# Patient Record
Sex: FEMALE | Race: WHITE | URBAN - METROPOLITAN AREA
[De-identification: names, ages, dates, MRNs, and addresses within clinical notes are randomized per-mention and may not be internally consistent; named-entity substitution may affect disease eponyms.]

---

## 2017-01-12 ENCOUNTER — OFFICE VISIT - RIVER FALLS (OUTPATIENT)
Dept: FAMILY MEDICINE | Facility: CLINIC | Age: 72
End: 2017-01-12
Payer: COMMERCIAL

## 2017-01-12 ASSESSMENT — MIFFLIN-ST. JEOR: SCORE: 1381.26

## 2017-02-28 ENCOUNTER — OFFICE VISIT - RIVER FALLS (OUTPATIENT)
Dept: FAMILY MEDICINE | Facility: CLINIC | Age: 72
End: 2017-02-28
Payer: COMMERCIAL

## 2017-02-28 ASSESSMENT — MIFFLIN-ST. JEOR: SCORE: 1386.7

## 2017-03-17 ENCOUNTER — OFFICE VISIT - RIVER FALLS (OUTPATIENT)
Dept: FAMILY MEDICINE | Facility: CLINIC | Age: 72
End: 2017-03-17
Payer: COMMERCIAL

## 2017-03-17 ASSESSMENT — MIFFLIN-ST. JEOR: SCORE: 1395.78

## 2017-06-01 ENCOUNTER — TRANSFERRED RECORDS (OUTPATIENT)
Dept: MULTI SPECIALTY CLINIC | Facility: CLINIC | Age: 72
End: 2017-06-01
Payer: COMMERCIAL

## 2017-06-01 ENCOUNTER — OFFICE VISIT - RIVER FALLS (OUTPATIENT)
Dept: FAMILY MEDICINE | Facility: CLINIC | Age: 72
End: 2017-06-01
Payer: COMMERCIAL

## 2017-06-01 ASSESSMENT — MIFFLIN-ST. JEOR: SCORE: 1383.98

## 2017-09-05 ENCOUNTER — OFFICE VISIT - RIVER FALLS (OUTPATIENT)
Dept: FAMILY MEDICINE | Facility: CLINIC | Age: 72
End: 2017-09-05
Payer: COMMERCIAL

## 2017-09-05 ASSESSMENT — MIFFLIN-ST. JEOR: SCORE: 1383.08

## 2018-01-02 ENCOUNTER — OFFICE VISIT - RIVER FALLS (OUTPATIENT)
Dept: FAMILY MEDICINE | Facility: CLINIC | Age: 73
End: 2018-01-02
Payer: COMMERCIAL

## 2018-01-02 ASSESSMENT — MIFFLIN-ST. JEOR: SCORE: 1432.06

## 2018-04-05 ENCOUNTER — AMBULATORY - RIVER FALLS (OUTPATIENT)
Dept: FAMILY MEDICINE | Facility: CLINIC | Age: 73
End: 2018-04-05
Payer: COMMERCIAL

## 2018-04-12 ENCOUNTER — OFFICE VISIT - RIVER FALLS (OUTPATIENT)
Dept: FAMILY MEDICINE | Facility: CLINIC | Age: 73
End: 2018-04-12
Payer: COMMERCIAL

## 2018-04-12 ASSESSMENT — MIFFLIN-ST. JEOR: SCORE: 1399.4

## 2021-01-19 ENCOUNTER — HOME INFUSION (PRE-WILLOW HOME INFUSION) (OUTPATIENT)
Dept: PHARMACY | Facility: CLINIC | Age: 76
End: 2021-01-19

## 2021-01-20 ENCOUNTER — HOME INFUSION (PRE-WILLOW HOME INFUSION) (OUTPATIENT)
Dept: PHARMACY | Facility: CLINIC | Age: 76
End: 2021-01-20

## 2021-01-20 NOTE — PROGRESS NOTES
This is a recent snapshot of the patient's [facility] medical record.  For current drug dose and complete information and questions, call [phone] or In Basket pool, fv home infusion ([id number])  CSN Number:  [id number]

## 2021-01-21 ENCOUNTER — HOME INFUSION (PRE-WILLOW HOME INFUSION) (OUTPATIENT)
Dept: PHARMACY | Facility: CLINIC | Age: 76
End: 2021-01-21

## 2021-01-21 NOTE — PROGRESS NOTES
This is a recent snapshot of the patient's [facility] medical record.  For current drug dose and complete information and questions, call [phone] or In Basket pool, fv home infusion ([id number])  CSN Number:  [id number]

## 2021-01-22 NOTE — PROGRESS NOTES
This is a recent snapshot of the patient's [facility] medical record.  For current drug dose and complete information and questions, call [phone] or In Basket pool, fv home infusion ([id number])  CSN Number:  [id number]

## 2021-01-28 ENCOUNTER — HOME INFUSION (PRE-WILLOW HOME INFUSION) (OUTPATIENT)
Dept: PHARMACY | Facility: CLINIC | Age: 76
End: 2021-01-28

## 2021-01-29 NOTE — PROGRESS NOTES
This is a recent snapshot of the patient's [facility] medical record.  For current drug dose and complete information and questions, call [phone] or In Basket pool, fv home infusion ([id number])  CSN Number:  [id number]

## 2021-08-15 ENCOUNTER — LAB REQUISITION (OUTPATIENT)
Dept: LAB | Facility: CLINIC | Age: 76
End: 2021-08-15
Payer: MEDICARE

## 2021-08-15 DIAGNOSIS — F02.818 DEMENTIA IN OTHER DISEASES CLASSIFIED ELSEWHERE WITH BEHAVIORAL DISTURBANCE: ICD-10-CM

## 2021-08-15 DIAGNOSIS — R63.5 ABNORMAL WEIGHT GAIN: ICD-10-CM

## 2021-08-15 DIAGNOSIS — E55.9 VITAMIN D DEFICIENCY, UNSPECIFIED: ICD-10-CM

## 2021-08-15 DIAGNOSIS — R60.0 LOCALIZED EDEMA: ICD-10-CM

## 2021-08-15 DIAGNOSIS — I10 ESSENTIAL (PRIMARY) HYPERTENSION: ICD-10-CM

## 2021-11-15 ENCOUNTER — LAB REQUISITION (OUTPATIENT)
Dept: LAB | Facility: CLINIC | Age: 76
End: 2021-11-15
Payer: COMMERCIAL

## 2021-11-15 DIAGNOSIS — N39.0 URINARY TRACT INFECTION, SITE NOT SPECIFIED: ICD-10-CM

## 2021-11-16 ENCOUNTER — LAB REQUISITION (OUTPATIENT)
Dept: LAB | Facility: CLINIC | Age: 76
End: 2021-11-16
Payer: COMMERCIAL

## 2021-11-16 LAB
ALBUMIN UR-MCNC: NEGATIVE MG/DL
APPEARANCE UR: CLEAR
BILIRUB UR QL STRIP: NEGATIVE
COLOR UR AUTO: ABNORMAL
GLUCOSE UR STRIP-MCNC: NEGATIVE MG/DL
HGB UR QL STRIP: NEGATIVE
KETONES UR STRIP-MCNC: NEGATIVE MG/DL
LEUKOCYTE ESTERASE UR QL STRIP: ABNORMAL
MUCOUS THREADS #/AREA URNS LPF: PRESENT /LPF
NITRATE UR QL: NEGATIVE
PH UR STRIP: 5 [PH] (ref 5–7)
RBC URINE: 1 /HPF
SP GR UR STRIP: 1.01 (ref 1–1.03)
SQUAMOUS EPITHELIAL: 1 /HPF
UROBILINOGEN UR STRIP-MCNC: <2 MG/DL
WBC URINE: 2 /HPF

## 2021-11-16 PROCEDURE — 81001 URINALYSIS AUTO W/SCOPE: CPT | Mod: ORL | Performed by: FAMILY MEDICINE

## 2021-11-16 PROCEDURE — 87086 URINE CULTURE/COLONY COUNT: CPT | Mod: ORL | Performed by: FAMILY MEDICINE

## 2021-11-17 LAB — BACTERIA UR CULT: NORMAL

## 2021-11-22 ENCOUNTER — LAB REQUISITION (OUTPATIENT)
Dept: LAB | Facility: CLINIC | Age: 76
End: 2021-11-22
Payer: COMMERCIAL

## 2021-11-22 DIAGNOSIS — I10 ESSENTIAL (PRIMARY) HYPERTENSION: ICD-10-CM

## 2021-11-23 LAB
ANION GAP SERPL CALCULATED.3IONS-SCNC: 10 MMOL/L (ref 5–18)
BUN SERPL-MCNC: 23 MG/DL (ref 8–28)
CALCIUM SERPL-MCNC: 9.6 MG/DL (ref 8.5–10.5)
CHLORIDE BLD-SCNC: 106 MMOL/L (ref 98–107)
CO2 SERPL-SCNC: 28 MMOL/L (ref 22–31)
CREAT SERPL-MCNC: 1.19 MG/DL (ref 0.6–1.1)
ERYTHROCYTE [DISTWIDTH] IN BLOOD BY AUTOMATED COUNT: 13.9 % (ref 10–15)
GFR SERPL CREATININE-BSD FRML MDRD: 44 ML/MIN/1.73M2
GLUCOSE BLD-MCNC: 85 MG/DL (ref 70–125)
HCT VFR BLD AUTO: 46.7 % (ref 35–47)
HGB BLD-MCNC: 14.9 G/DL (ref 11.7–15.7)
MCH RBC QN AUTO: 30.5 PG (ref 26.5–33)
MCHC RBC AUTO-ENTMCNC: 31.9 G/DL (ref 31.5–36.5)
MCV RBC AUTO: 96 FL (ref 78–100)
PLATELET # BLD AUTO: 291 10E3/UL (ref 150–450)
POTASSIUM BLD-SCNC: 4.5 MMOL/L (ref 3.5–5)
RBC # BLD AUTO: 4.88 10E6/UL (ref 3.8–5.2)
SODIUM SERPL-SCNC: 144 MMOL/L (ref 136–145)
TSH SERPL DL<=0.005 MIU/L-ACNC: 1.93 UIU/ML (ref 0.3–5)
WBC # BLD AUTO: 6.1 10E3/UL (ref 4–11)

## 2021-11-23 PROCEDURE — 85027 COMPLETE CBC AUTOMATED: CPT | Mod: ORL | Performed by: FAMILY MEDICINE

## 2021-11-23 PROCEDURE — 84443 ASSAY THYROID STIM HORMONE: CPT | Mod: ORL | Performed by: FAMILY MEDICINE

## 2021-11-23 PROCEDURE — 36415 COLL VENOUS BLD VENIPUNCTURE: CPT | Mod: ORL | Performed by: FAMILY MEDICINE

## 2021-11-23 PROCEDURE — P9603 ONE-WAY ALLOW PRORATED MILES: HCPCS | Mod: ORL | Performed by: FAMILY MEDICINE

## 2021-11-23 PROCEDURE — 80048 BASIC METABOLIC PNL TOTAL CA: CPT | Mod: ORL | Performed by: FAMILY MEDICINE

## 2022-02-06 ENCOUNTER — LAB REQUISITION (OUTPATIENT)
Dept: LAB | Facility: CLINIC | Age: 77
End: 2022-02-06
Payer: COMMERCIAL

## 2022-02-06 DIAGNOSIS — D72.829 ELEVATED WHITE BLOOD CELL COUNT, UNSPECIFIED: ICD-10-CM

## 2022-02-08 LAB
BASOPHILS # BLD AUTO: 0 10E3/UL (ref 0–0.2)
BASOPHILS NFR BLD AUTO: 0 %
EOSINOPHIL # BLD AUTO: 0 10E3/UL (ref 0–0.7)
EOSINOPHIL NFR BLD AUTO: 0 %
ERYTHROCYTE [DISTWIDTH] IN BLOOD BY AUTOMATED COUNT: 14.2 % (ref 10–15)
HCT VFR BLD AUTO: 44.9 % (ref 35–47)
HGB BLD-MCNC: 14.3 G/DL (ref 11.7–15.7)
IMM GRANULOCYTES # BLD: 0 10E3/UL
IMM GRANULOCYTES NFR BLD: 0 %
LYMPHOCYTES # BLD AUTO: 0.2 10E3/UL (ref 0.8–5.3)
LYMPHOCYTES NFR BLD AUTO: 2 %
MCH RBC QN AUTO: 31.1 PG (ref 26.5–33)
MCHC RBC AUTO-ENTMCNC: 31.8 G/DL (ref 31.5–36.5)
MCV RBC AUTO: 98 FL (ref 78–100)
MONOCYTES # BLD AUTO: 0.5 10E3/UL (ref 0–1.3)
MONOCYTES NFR BLD AUTO: 5 %
NEUTROPHILS # BLD AUTO: 9.3 10E3/UL (ref 1.6–8.3)
NEUTROPHILS NFR BLD AUTO: 93 %
NRBC # BLD AUTO: 0 10E3/UL
NRBC BLD AUTO-RTO: 0 /100
PLATELET # BLD AUTO: 294 10E3/UL (ref 150–450)
RBC # BLD AUTO: 4.6 10E6/UL (ref 3.8–5.2)
WBC # BLD AUTO: 10 10E3/UL (ref 4–11)

## 2022-02-08 PROCEDURE — 36415 COLL VENOUS BLD VENIPUNCTURE: CPT | Mod: ORL | Performed by: PHYSICIAN ASSISTANT

## 2022-02-08 PROCEDURE — 85025 COMPLETE CBC W/AUTO DIFF WBC: CPT | Mod: ORL | Performed by: PHYSICIAN ASSISTANT

## 2022-02-08 PROCEDURE — P9603 ONE-WAY ALLOW PRORATED MILES: HCPCS | Mod: ORL | Performed by: PHYSICIAN ASSISTANT

## 2022-02-11 VITALS
DIASTOLIC BLOOD PRESSURE: 72 MMHG | WEIGHT: 181.6 LBS | BODY MASS INDEX: 26.37 KG/M2 | WEIGHT: 184.2 LBS | SYSTOLIC BLOOD PRESSURE: 126 MMHG | HEIGHT: 70 IN | DIASTOLIC BLOOD PRESSURE: 84 MMHG | OXYGEN SATURATION: 98 % | SYSTOLIC BLOOD PRESSURE: 130 MMHG | HEART RATE: 82 BPM | HEART RATE: 62 BPM | BODY MASS INDEX: 26 KG/M2 | HEIGHT: 70 IN

## 2022-02-11 VITALS
DIASTOLIC BLOOD PRESSURE: 84 MMHG | HEART RATE: 86 BPM | SYSTOLIC BLOOD PRESSURE: 134 MMHG | OXYGEN SATURATION: 94 % | WEIGHT: 192.2 LBS | BODY MASS INDEX: 27.52 KG/M2 | HEIGHT: 70 IN

## 2022-02-11 VITALS
DIASTOLIC BLOOD PRESSURE: 74 MMHG | DIASTOLIC BLOOD PRESSURE: 70 MMHG | HEART RATE: 78 BPM | HEIGHT: 70 IN | WEIGHT: 181 LBS | BODY MASS INDEX: 26.08 KG/M2 | BODY MASS INDEX: 25.91 KG/M2 | WEIGHT: 182.2 LBS | HEART RATE: 78 BPM | OXYGEN SATURATION: 97 % | SYSTOLIC BLOOD PRESSURE: 122 MMHG | HEIGHT: 70 IN | SYSTOLIC BLOOD PRESSURE: 122 MMHG | TEMPERATURE: 98.6 F

## 2022-02-11 VITALS
DIASTOLIC BLOOD PRESSURE: 78 MMHG | OXYGEN SATURATION: 95 % | SYSTOLIC BLOOD PRESSURE: 130 MMHG | HEART RATE: 91 BPM | BODY MASS INDEX: 26.48 KG/M2 | WEIGHT: 185 LBS | DIASTOLIC BLOOD PRESSURE: 78 MMHG | SYSTOLIC BLOOD PRESSURE: 128 MMHG | HEIGHT: 70 IN

## 2022-02-11 VITALS
WEIGHT: 181.4 LBS | OXYGEN SATURATION: 98 % | BODY MASS INDEX: 25.97 KG/M2 | HEIGHT: 70 IN | DIASTOLIC BLOOD PRESSURE: 86 MMHG | HEART RATE: 78 BPM | SYSTOLIC BLOOD PRESSURE: 132 MMHG

## 2022-02-16 NOTE — PROGRESS NOTES
Patient:   MARY ALICE SMITH            MRN: [id number]            FIN: [id number]               Age:   71 years     Sex:  Female     :  1945   Associated Diagnoses:   Medicare annual wellness visit, initial; Hypercholesteremia; Hypertension; Memory Loss or Impairment; Mild obstructive sleep apnea; Depression; ADHD (attention deficit hyperactivity disorder)   Author:   Cale Horta MD      Visit Information      Care Providers  Not recorded for selected visit.  Ancillary Services  Not recorded for selected visit.          Current Visit Date:  2017  Last AWV Date:  2016  Initial AWV Date:  2015          Chief Complaint   2017 10:03 AM CDT    AWV      History of Present Illness             The patient presents for Medicare annual wellness exam.  The patient's general health status is described as unchanged and stable.  The patient's diet is described as balanced.  Exercise occasional.  Associated symptoms consist of denies shortness of breath and denies weight loss.     She has not been taking aricept due to running out of medication. She does take namenda. She understands her memory is not as good as it use to be and that does make her feel depressed. She does start crying easier. She still does the cooking and cleaning. She has noticed that she has a tremor when she is sewing or baking. She has not been using her cpap machine. She doesn't have the nightmares she use to have.    testing/labs: colonoscopy: 09, Mammogram : 16. Dexa: 13. Chem 8 and Lipid panel: 17  immunization: tetnus: 2002. Prevnar: 2015. Pneumovax: . Shingles: 2009      Review of Systems   Constitutional:  No fever.    Ear/Nose/Mouth/Throat:  Hearing is evaluated.    See completed Health History for Review of Systems   Respiratory:  No shortness of breath.    Cardiovascular:  No chest pain.    Gastrointestinal:  No nausea, No vomiting.    Musculoskeletal:       Back pain: Bilaterally.     Integumentary:  No rash.    Neurologic:  Memory loss, Tremor.       Health Status   Allergies:    Allergic Reactions (Selected)  Severity Not Documented  Sulfa drugs (No reactions were documented)   Medications:  (Selected)   Prescriptions  Prescribed  Adderall XR 10 mg oral capsule, extended release: 1 cap(s) ( 10 mg ), po, qam, # 30 cap(s), 0 Refill(s), Type: Maintenance, Pharmacy: [facility] [address], 1 cap(s) po qam  Detrol LA 4 mg oral capsule, extended release: 1 cap(s) ( 4 mg ), po, daily, # 90 cap(s), 3 Refill(s), Type: Maintenance, Pharmacy: [facility] [address], 1 cap(s) po daily  Imitrex 50 mg oral tablet: 1 tab(s) ( 50 mg ), PO, Once, Instructions: may repeat dose once in 2 hours, PRN: for migraine headache, # 18 tab(s), 1 Refill(s), Type: Soft Stop, Pharmacy: [facility] [address], 1 tab(s) po once,PRN:for migraine headache,Instr:may repeat dose...  Namenda 5 mg oral tablet: See Instructions, Instructions: 1 tab(s) po daily for 1 week than increase to 1 tablet po pid, # 180 tab(s), 1 Refill(s), Type: Maintenance, Pharmacy: [facility] [address], 1 tab(s) po daily for 1 week than increase to 1 tablet po pid  buPROPion 150 mg/12 hours (SR) oral tablet, extended release: See Instructions, Instructions: TAKE 1 TABLET BY MOUTH TWICE DAILY, # 180 tab(s), Type: Soft Stop, Pharmacy: [facility] [address]  donepezil 10 mg oral tablet: 1 tab(s) ( 10 mg ), po, hs, # 30 tab(s), 0 Refill(s), Type: Maintenance, Pharmacy: [facility] [address], pt is due for annual exam  hydroCHLOROthiazide-lisinopril 12.5 mg-20 mg oral tablet: 1 tab(s), po, daily, # 90 tab(s), 0 Refill(s), Type: Maintenance, Pharmacy: [facility] [address]  simvastatin 20 mg oral tablet: 1 tab(s) ( 20 mg ), po, hs, # 90 tab(s), 2 Refill(s), Type: Maintenance, Pharmacy: [facility] [address]  triamcinolone 0.1% topical cream: 1 sami, TOP, BID, # 15 g, 1 Refill(s), Type: Maintenance, Pharmacy: [facility] [address], 1 sami top bid  Documented Medications  Documented  Adderall XR 10 mg oral capsule, extended release: 1 cap(s) ( 10 mg ), po, qam, 0 Refill(s), Type: Maintenance  Aleve: ( 220 mg ), po, 0 Refill(s), Type: Maintenance  Calcium 600+D: ( 1,200 mg ), po, daily, 0 Refill(s), Type: Maintenance  Lucy-C 500 mg oral tablet: ( 500 mg ), po, daily, 0 Refill(s), Type: Maintenance  Ginko: Ginko, Supply, 0 Refill(s), Type: Maintenance  Vitamin D with Minerals oral tablet: 1 tab(s), po, daily, 0 Refill(s), Type: Maintenance  biotin: PO, daily, 0 Refill(s), Type: Soft Stop  escitalopram 5 mg oral tablet: 1 tab(s) ( 5 mg ), PO, Daily, # 30 tab(s), 0 Refill(s), Type: Maintenance  magnesium oxide: ( 400 mg ), po, 0 Refill(s), Type: Maintenance   Problem list:    All Problems  Depression / SNOMED CT 830021523 / Confirmed  Mild obstructive sleep apnea / SNOMED CT 762225170 / Confirmed  Mitral valve prolapse / ICD-9-.0 / Confirmed  Memory Loss or Impairment / ICD-9-.93 / Confirmed  Hypertension / ICD-9-.9 / Confirmed  Hypercholesteremia / SNOMED CT 80858094 / Confirmed  ADHD (attention deficit hyperactivity disorder) / SNOMED CT 24564631 / Confirmed   Medicare Assessments      Fall Risk Screen  Not recorded for selected visit.     Functional Assessment  Not recorded for selected visit.       Geriatric Depression Screen  Not recorded for selected visit.     Hearing Screen  Not recorded for selected visit.     Home Safety Screen  Not recorded for selected visit.     Vision Screen  Not recorded for selected visit.     ADL's and Safety reviewed with patient.      Histories   Past Medical History:    Active  Mitral valve prolapse (424.0)  Comments:  7/21/2010 CDT 11:53 AM CDT - Emma Payne  prophylaxis  Depression (979878360)  Resolved  Pregnancy (989264769):  Resolved in 1964 at 18 years.  Pregnancy (066387304):  Resolved in 1969 at 23 years.   Family History:    Lymphoma  Father  Dementia  Mother  ()  Hypertension  Father  Kidney disease  Father  CVA - Cerebrovascular accident  Grandmother (P)  Grandfather (P)  Aphasia  Mother ()     Procedure history:    CPAP  Titration Study on 10/26/2010 at 64 Years.  Comments:  2011 8:16 AM - Marta Acevedo CMA  Mild MAY.  +7cm H2o  Excision of exposed vaginal mesh on 10/15/2010 at 64 Years.  Comments:  2010 10:59 AM - Marilyn Oleary  Exposed vaginal mesh.  Polysomnogram (862434725) on 10/6/2010 at 64 Years.  Comments:  2011 8:16 AM - Marta Acevedo CMA  Mild MAY.  Colonoscopy (076484646) on 2009 at 63 Years.  Comments:  2016 3:21 PM - Emma Terrell MA  Sedation:   IV.    Findings:   no polyps/abnormalities seen  LAVH - Laparoscopy assisted vaginal hysterectomy (7631737463) on 5/3/2006 at 60 Years.  BSO - Bilateral salpingo-oophorectomy (7010979553) on 5/3/2006 at 60 Years.  Introduction of tension free vaginal tape (268866541) on 5/3/2006 at 60 Years.  Comments:  10/24/2013 10:59 AM - Emma Payne  cystoscopy  Repair of cystocele and rectocele (23703060) on 5/3/2006 at 60 Years.  Sacrospinous fixation of vaginal vault (354876230) on 5/3/2006 at 60 Years.  Urodynamics (612453165) on 2006 at 60 Years.  Flexible sigmoidoscopy (57436801) on 2004 at 58 Years.  Colposcopy (2717543939) on 2002 at 56 Years.  LEEP (75292807) on 2000 at 54 Years.  Colposcopy (5282843055) on 2000 at 54 Years.  Colposcopy (8658644499) on 1997 at 51 Years.  Colposcopy (8850641928) on 3/9/1994 at 48 Years.  Procedure on ankle (093697188) in 1958 at 13 Years.  Tonsillectomy (195132338) in 1947 at 2 Years.  Childbirth (7753124141).  Comments:  10/24/2013 11:23 AM - Emma Payne/no date  Childbirth (6159412364).  Comments:  10/24/2013 11:24 AM - Emma Payne/no date  Miscarriage (83501955).   Social History:        Alcohol Assessment            Never      Tobacco Assessment            Never      Substance Abuse  Assessment            Never      Employment and Education Assessment            Retired, Work/School description: .      Home and Environment Assessment            Marital status: .  Spouse/Partner name: Griffin.  Lives with Spouse.      Nutrition and Health Assessment            Type of diet: Regular.      Exercise and Physical Activity Assessment: Does not exercise            Exercise type: Walking.      Sexual Assessment            Sexually active: Yes.  Sexual orientation: Heterosexual.  Other contraceptive use: None.        Physical Examination   Vital Signs   6/1/2017 10:03 AM CDT Peripheral Pulse Rate 62 bpm    Systolic Blood Pressure 126 mmHg    Diastolic Blood Pressure 72 mmHg    Mean Arterial Pressure 90 mmHg      Measurements from flowsheet : Measurements   6/1/2017 10:03 AM CDT Height Measured - Standard 70 in    Weight Measured - Standard 181.6 lb    BSA 2.01 m2    Body Mass Index 26.05 kg/m2      General:  Alert and oriented, No acute distress.    Eye:  Pupils are equal, round and reactive to light, Normal conjunctiva.    HENT:  Tympanic membranes are clear, Oral mucosa is moist.    Neck:  Supple, Non-tender, No carotid bruit, No lymphadenopathy.    Respiratory:  Respirations are non-labored.    Cardiovascular:  Normal rate, Regular rhythm, No edema.    Gastrointestinal:  Soft, Non-tender, Non-distended.    Musculoskeletal:  Normal range of motion, Normal gait.    Integumentary:  Warm, No rash.    Neurologic:  Alert, Oriented, No focal deficits.    Cognition and Speech:  Oriented, Speech clear and coherent, Functional cognition intact.    Psychiatric:  Cooperative, Appropriate mood & affect, Normal judgment, Patient's GDS and CAGE questionnaire reviewed and discussed with patient. .       Impression and Plan   Course:  Progressing as expected.    Plan:  Discussed  preventative services.  Appropriate weight and diet discussed.  Discussed Advance Life Directives.    Preventative services  needed::  Preventative services checklist reviewed, updated and copy given to patient.  Please see scanned document.         HIV risk screening: No.    Patient Instructions:          Limitations: Limit caffeine intake, Limit alcohol consumption.         Counseled: Patient, Regarding treatment, Regarding medications, Diet, Activity, Verbalized understanding.    Diagnosis     Medicare annual wellness visit, initial (ARF90-JH Z00.00).     Hypercholesteremia (ACV68-XX E78.0).     Hypertension (FDS03-AJ I10).     Memory Loss or Impairment (VXO46-JC R41.3).     Mild obstructive sleep apnea (SWU00-RK G47.33).     Depression (ZSM11-CO F33.1).     ADHD (attention deficit hyperactivity disorder) (XIF15-JZ F90.0).     Plan:  Will continue current medicatons at current doses. Discussed side effects of medications. Will restart her on Aricept, discussed that she may have some nausea the first week after restarting. Reviewed exercise and diet;  Discussed weight and weight loss;  Reviewed health maintenance and encouraged completion;  Questions were answered regarding health, medicines, and future expectations.   Follow up in 1 year.

## 2022-02-16 NOTE — PROGRESS NOTES
Patient:   MARY ALICE SMITH            MRN: [id number]            FIN: [id number]               Age:   71 years     Sex:  Female     :  1945   Associated Diagnoses:   Hypercholesteremia; Hypertension; Memory Loss or Impairment; ADHD (attention deficit hyperactivity disorder)   Author:   Cale Horta MD      Chief Complaint   2017 1:19 PM CDT     Pt here for fu with meds, she says she is doing ok on meds, would like refills        Interval History   Follow up on attention deficit disorder.    Medication Dose:  Adderall 10 mg q am    Daily Functions (School/Grades/Work):  She is doing well on medication and notices benefit while on it.    Memory loss: She currently takes Aricept. is her memory and support.  no recent changes      Review of Systems   Constitutional:  Negative.    Eye:  Negative.    Ear/Nose/Mouth/Throat:  Negative.    Respiratory:  Negative.    Cardiovascular:  No palpitations, No tachycardia.    Gastrointestinal:  No nausea, No vomiting, No diarrhea.    Genitourinary:  Negative.    Musculoskeletal:  Negative.    Integumentary:  Negative.    Neurologic:  No headache.    Psychiatric:  No anxiety, No depression.       Health Status   Allergies:    Allergic Reactions (Selected)  Severity Not Documented  Sulfa drugs (No reactions were documented)   Problem list:    All Problems  Mitral valve prolapse / 424.0 / Confirmed  prophylaxis  Depression / 665612789 / Confirmed  Mild obstructive sleep apnea / 231284939 / Confirmed  AHI 7. Optimal CPAP titration to 7.  Hypertension / 401.9 / Confirmed  Memory loss or impairment / 8515997139 / Confirmed  ADHD (attention deficit hyperactivity disorder) / 92951151 / Confirmed  Hypercholesteremia / 77779271 / Confirmed  Resolved: Pregnancy / 910594810  Resolved: Pregnancy / 443969082  Canceled: Hypertension / 401.9  Canceled: Hypercholesterolemia / 21914181   Medications:  (Selected)   Prescriptions  Prescribed  Adderall XR 10 mg oral capsule,  extended release: 1 cap(s) ( 10 mg ), po, qam, Instructions: fill in 1 month, # 30 cap(s), 0 Refill(s), Type: Maintenance, Pharmacy: [facility] [address], 1 cap(s) po qam,Instr:fill in 1 month  Detrol LA 4 mg oral capsule, extended release: 1 cap(s) ( 4 mg ), po, daily, # 90 cap(s), 3 Refill(s), Type: Maintenance, Pharmacy: [facility] [address], 1 cap(s) po daily,x90 day(s)  Imitrex 50 mg oral tablet: 1 tab(s) ( 50 mg ), PO, Once, Instructions: may repeat dose once in 2 hours, PRN: for migraine headache, # 18 tab(s), 1 Refill(s), Type: Soft Stop, Pharmacy: [facility], 1 tab(s) po once,PRN:for migraine headache,Instr:may repeat dose...  Namenda 5 mg oral tablet: 1 tab(s) ( 5 mg ), po, bid, # 180 tab(s), 3 Refill(s), Type: Maintenance, Pharmacy: [facility] [address], 1 tab(s) po bid,x90 day(s)  buPROPion 150 mg/12 hours (SR) oral tablet, extended release: 1 tab(s) ( 150 mg ), po, bid, # 180 tab(s), 3 Refill(s), Type: Soft Stop, Pharmacy: [facility] [address], 1 tab(s) po bid,x90 day(s)  donepezil 10 mg oral tablet: 1 tab(s) ( 10 mg ), po, hs, # 90 tab(s), 3 Refill(s), Type: Maintenance, Pharmacy: [facility] [address], 1 tab(s) po hs,x90 day(s)  escitalopram 5 mg oral tablet: 1 tab(s) ( 5 mg ), po, daily, # 90 tab(s), 2 Refill(s), Type: Maintenance, Pharmacy: [facility]  hydrochlorothiazide-lisinopril 12.5 mg-20 mg oral tablet: 1 tab(s), po, daily, # 90 tab(s), 3 Refill(s), Type: Maintenance, Pharmacy: [facility] [address], 1 tab(s) po daily,x90 day(s)  simvastatin 20 mg oral tablet: 1 tab(s) ( 20 mg ), po, hs, # 90 tab(s), 3 Refill(s), Type: Maintenance, Pharmacy: [facility] [address], 1 tab(s) po hs,x90 day(s)  Documented Medications  Documented  Aleve: ( 220 mg ), po, 0 Refill(s), Type: Maintenance  Calcium 600+D: ( 1,200 mg ), po, daily, 0 Refill(s), Type: Maintenance  Lucy-C 500 mg oral tablet: ( 500 mg ), po, daily, 0 Refill(s), Type:  Maintenance  Ginko: Ginko, Supply, 0 Refill(s), Type: Maintenance  Vitamin D with Minerals oral tablet: 1 tab(s), po, daily, 0 Refill(s), Type: Maintenance  biotin: PO, daily, 0 Refill(s), Type: Soft Stop  magnesium oxide: ( 400 mg ), po, 0 Refill(s), Type: Maintenance      Histories   Past Medical History:    Active  Memory loss or impairment (2361680918): Onset on 2013 at 67 years.  Mitral valve prolapse (424.0)  Comments:  2010 CDT 11:53 AM CDT - Emma Payne  prophylaxis  Depression (297152883)  Resolved  Pregnancy (953985187):  Resolved in  at 18 years.  Pregnancy (142337387):  Resolved in  at 23 years.   Family History:    Lymphoma  Father  Dementia  Mother ()  Hypertension  Father  Kidney disease  Father  CVA - Cerebrovascular accident  Grandmother (P)  Grandfather (P)  Aphasia  Mother ()     Procedure history:    CPAP  Titration Study on 10/26/2010 at 64 Years.  Comments:  2011 8:16 AM - Marta Acevedo CMA  Mild MAY.  +7cm H2o  Excision of exposed vaginal mesh performed by Gabriel Murphy MD on 10/15/2010 at 64 Years.  Comments:  2010 10:59 AM - Marilyn Oleary  Exposed vaginal mesh.  Polysomnogram (SNOMED CT 730099135) on 10/6/2010 at 64 Years.  Comments:  2011 8:16 AM - Marta Acevedo CMA  Mild MAY.  Colonoscopy (SNOMED CT 193104516) performed by Jose Alberto Mandel MD on 2009 at 63 Years.  Comments:  2016 3:21 PM - Emma Terrell MA  Sedation:   IV.    Findings:   no polyps/abnormalities seen  LAVH - Laparoscopy assisted vaginal hysterectomy (SNOMED CT 8171732227) performed by Gabriel Murphy MD on 5/3/2006 at 60 Years.  BSO - Bilateral salpingo-oophorectomy (SNOMED CT 2857791097) performed by Gabriel Murphy MD on 5/3/2006 at 60 Years.  Introduction of tension free vaginal tape (SNOMED CT 075772510) performed by Gabriel Murphy MD on 5/3/2006 at 60 Years.  Comments:  10/24/2013 10:59 AM - Emma Payne  cystoscopy  Repair of cystocele and rectocele  (SNOMED CT 22286955) performed by Gabriel Murphy MD on 5/3/2006 at 60 Years.  Sacrospinous fixation of vaginal vault (SNOMED CT 731300566) performed by Gabriel Murphy MD on 5/3/2006 at 60 Years.  Urodynamics (SNOMED CT 569978737) performed by Elizabeth Kimbrough on 2006 at 60 Years.  Flexible sigmoidoscopy (SNOMED CT 73516891) performed by Morgan Snell MD on 2004 at 58 Years.  Colposcopy (SNOMED CT 0113588086) performed by Tammy Tinsley MD on 2002 at 56 Years.  LEEP (SNOMED CT 47217840) performed by Gabriel Murphy MD on 2000 at 54 Years.  Colposcopy (SNOMED CT 4171825144) performed by David HICKEY on 2000 at 54 Years.  Colposcopy (SNOMED CT 6698470600) performed by David HICKEY on 1997 at 51 Years.  Colposcopy (SNOMED CT 2757663072) performed by David HICKEY on 3/9/1994 at 48 Years.  Procedure on ankle (SNOMED CT 632997564) in 1958 at 13 Years.  Tonsillectomy (SNOMED CT 635607788) in 1947 at 2 Years.  Childbirth (SNOMED CT 5586969628).  Comments:  10/24/2013 11:23 AM - Emma Payne  /no date  Childbirth (SNOMED CT 4112047218).  Comments:  10/24/2013 11:24 AM - Emma Payne  /no date  Miscarriage (SNOMED CT 25252459).   Social History:        Alcohol Assessment            Never      Tobacco Assessment            Never      Substance Abuse Assessment            Never      Employment and Education Assessment            Retired, Work/School description: .      Home and Environment Assessment            Marital status: .  Spouse/Partner name: Griffin.  Lives with Spouse.      Nutrition and Health Assessment            Type of diet: Regular.      Exercise and Physical Activity Assessment: Does not exercise            Exercise type: Walking.      Sexual Assessment            Sexually active: Yes.  Sexual orientation: Heterosexual.  Other contraceptive use: None.        Physical Examination   Vital Signs   2017 1:19 PM CDT Peripheral Pulse Rate 78 bpm     Pulse Site Radial artery    Systolic Blood Pressure 132 mmHg    Diastolic Blood Pressure 86 mmHg    Mean Arterial Pressure 101 mmHg    BP Site Right arm    Oxygen Saturation 98 %      Measurements from flowsheet : Measurements   9/5/2017 1:19 PM CDT Height Measured - Standard 70 in    Weight Measured - Standard 181.4 lb    BSA 2.01 m2    Body Mass Index 26.03 kg/m2      General:  Alert and oriented, No acute distress.    Eye:  Pupils are equal, round and reactive to light, Normal conjunctiva.    HENT:  Oral mucosa is moist.    Neck:  Supple.    Respiratory:  Respirations are non-labored.    Cardiovascular:  Normal rate, Regular rhythm, No edema.    Gastrointestinal:  Non-distended.    Musculoskeletal:  Normal gait.    Integumentary:  Warm, No rash.    Psychiatric:  Cooperative, Appropriate mood & affect, Normal judgment.       Impression and Plan   Diagnosis     Hypercholesteremia (SDY33-VG E78.0).     Hypertension (PPF86-VI I10).     Memory Loss or Impairment (NXA66-YG R41.3).     ADHD (attention deficit hyperactivity disorder) (YWM23-XR F90.0).     Course:  Progressing as expected.    Plan:  Will refill medication at current dose.  Two prescriptions where sent to pharmacy, one for this month and one for next month. She can call for her third prescription. Will have her follow up in 3 months. Will send to lab for Lipid and chem 8. She will continue thinking about going forward with trial. Discussed we could change her to Numenda but will look into it first. Reviewed expected course, what to watch for, and when to return.   I, Shyann Cortez [facility] [address], acted solely as a scribe for, and in the presence of Dr. Cale Horta who performed the service.    Stimulants can provide significant help; however, there can be side effects including decreased appetite, trouble sleeping, and sometimes irritability or emotions that change quickly.  You must keep careful count of when you take your medication and keep control of your  medication to avoid theft or loss. You will need regular follow up appointments for this medication.  This medicine needs to be prescribed and given to you directly and cannot be called to the pharmacy.  Medications are only part of the therapy; you need to work on your skills and become well educated about attention deficit.  Stimulant medications can be addictive so please contact us before changing your dosing and therapy. .    Orders

## 2022-02-16 NOTE — NURSING NOTE
Phone Message    PCP:    KEREN      Time of Call:  1945       Person Calling:   Griffin    Note:  Griffin called because Pat has received several reminder letters.  The last one was for hypertension and he stated she doesn't have that diagnosis.  I reviewed her chart and indeed HTN is on her list and she is taking medication.  She is in need of her Adderall refill but looking at the RTC orders she was to be seen this month.  I advised Griffin to schedule and call was transferred.      Transferred to: _

## 2022-02-16 NOTE — PROGRESS NOTES
Patient:   MARY ALICE SMITH            MRN: [id number]            FIN: [id number]               Age:   71 years     Sex:  Female     :  1945   Associated Diagnoses:   Memory Loss or Impairment; Incurvated nail   Author:   Cale Horta MD      Chief Complaint   3/17/2017 10:27 AM CDT   pt here for medication check, is doing well on her current meds, also has right toe believes pos ingrown      History of Present Illness   see chief complaint as noted above and confirmed with the patient     71 year old female here today with her , would like to start Namenda, she is currently taking Aricept 10 tabs once a day at night. Her  help's to take care of her, she is able to do many things she is trying to keep her memory as sharp as she can. She does not complain of side effects from the Aricept.     Right big toe: She thinks it may be ingrown, has been trying to pull the nail out and push the skin down. States she has been using a isaura on it and has found no improvement.       Review of Systems   Constitutional:  No fever.    Ear/Nose/Mouth/Throat:  No sore throat.    Respiratory:  No shortness of breath, No cough, No sputum production.    Cardiovascular:  No chest pain.    Gastrointestinal:  No nausea, No vomiting, No diarrhea.    Integumentary:  No rash.    Neurologic:  Alert and oriented X4, No headache.       Health Status   Allergies:    Allergic Reactions (Selected)  Severity Not Documented  Sulfa drugs (No reactions were documented)   Medications:  (Selected)   Prescriptions  Prescribed  Adderall XR 10 mg oral capsule, extended release: 1 cap(s) ( 10 mg ), po, qam, Instructions: fill in 1 month, # 30 cap(s), 0 Refill(s), Type: Maintenance, Pharmacy: [facility], 1 cap(s) po qam,Instr:fill in 1 month  Aricept 10 mg oral tablet: 1 tab(s) ( 10 mg ), PO, Once a day (at bedtime), # 90 tab(s), 3 Refill(s), Type: Maintenance, Pharmacy: [facility], 1 tab(s) po  hs  Detrol LA 4 mg oral capsule, extended release: 1 cap(s) ( 4 mg ), po, daily, # 90 cap(s), 3 Refill(s), Type: Maintenance, Pharmacy: [facility] [address], 1 cap(s) po daily  Imitrex 50 mg oral tablet: 1 tab(s) ( 50 mg ), PO, Once, Instructions: may repeat dose once in 2 hours, PRN: for migraine headache, # 18 tab(s), 1 Refill(s), Type: Soft Stop, Pharmacy: [facility] [address], 1 tab(s) po once,PRN:for migraine headache,Instr:may repeat dose...  buPROPion 150 mg/12 hours (SR) oral tablet, extended release: 1 tab(s) ( 150 mg ), po, bid, # 180 tab(s), 0 Refill(s), Type: Maintenance, Pharmacy: [facility] [address]  hydrochlorothiazide-lisinopril 12.5 mg-20 mg oral tablet: 1 tab(s), po, daily, # 90 tab(s), 3 Refill(s), Type: Maintenance, Pharmacy: [facility] [address], 1 tab(s) po daily  simvastatin 20 mg oral tablet: 1 tab(s) ( 20 mg ), PO, Once a day (at bedtime), # 90 tab(s), 3 Refill(s), Type: Maintenance, Pharmacy: [facility] [address], 1 tab(s) po hs  triamcinolone 0.1% topical cream: 1 sami, TOP, BID, # 15 g, 1 Refill(s), Type: Maintenance, Pharmacy: [facility] [address], 1 sami top bid  Documented Medications  Documented  Adderall XR 10 mg oral capsule, extended release: 1 cap(s) ( 10 mg ), po, qam, 0 Refill(s), Type: Maintenance  Aleve: ( 220 mg ), po, 0 Refill(s), Type: Maintenance  Calcium 600+D: ( 1,200 mg ), po, daily, 0 Refill(s), Type: Maintenance  Lucy-C 500 mg oral tablet: ( 500 mg ), po, daily, 0 Refill(s), Type: Maintenance  Ginko: Ginko, Supply, 0 Refill(s), Type: Maintenance  Vitamin D with Minerals oral tablet: 1 tab(s), po, daily, 0 Refill(s), Type: Maintenance  biotin: PO, daily, 0 Refill(s), Type: Soft Stop  escitalopram 5 mg oral tablet: 1 tab(s) ( 5 mg ), PO, Daily, # 30 tab(s), 0 Refill(s), Type: Maintenance  magnesium oxide: ( 400 mg ), po, 0 Refill(s), Type: Maintenance   Problem list:    All Problems  ADHD (attention deficit hyperactivity disorder) /  SNOMED CT 19133474 / Confirmed  Hypercholesteremia / SNOMED CT 07052791 / Confirmed  Hypertension / ICD-9-.9 / Confirmed  Memory Loss or Impairment / ICD-9-.93 / Confirmed  Mitral valve prolapse / ICD-9-.0 / Confirmed  Mild obstructive sleep apnea / SNOMED CT 146517431 / Confirmed  Depression / SNOMED CT 019266166 / Confirmed  Resolved: Pregnancy / SNOMED CT 540944819  Resolved: Pregnancy / SNOMED CT 024582888  Canceled: Hypercholesterolemia / SNOMED CT 51941412  Canceled: Hypertension / ICD-9-.9      Histories   Past Medical History:    Active  Mitral valve prolapse (424.0)  Comments:  2010 CDT 11:53 AM CDT - Emma Payne  prophylaxis  Depression (377884045)  Resolved  Pregnancy (126067824):  Resolved in  at 18 years.  Pregnancy (971628026):  Resolved in  at 23 years.   Family History:    Lymphoma  Father  Dementia  Mother ()  Hypertension  Father  Kidney disease  Father  CVA - Cerebrovascular accident  Grandmother (P)  Grandfather (P)  Aphasia  Mother ()     Procedure history:    CPAP  Titration Study on 10/26/2010 at 64 Years.  Comments:  2011 8:16 AM - Marta Acevedo CMA  Mild MAY.  +7cm H2o  Excision of exposed vaginal mesh on 10/15/2010 at 64 Years.  Comments:  2010 10:59 AM - Marilyn Oleary  Exposed vaginal mesh.  Polysomnogram ([id number]) on 10/6/2010 at 64 Years.  Comments:  2011 8:16 AM - Marta Acevedo CMA  Mild MAY.  Colonoscopy (973785354) on 2009 at 63 Years.  Comments:  2016 3:21 PM - Emma Terrell MA  Sedation:   IV.    Findings:   no polyps/abnormalities seen  LAVH - Laparoscopy assisted vaginal hysterectomy (0675425796) on 5/3/2006 at 60 Years.  BSO - Bilateral salpingo-oophorectomy (3497980227) on 5/3/2006 at 60 Years.  Introduction of tension free vaginal tape (697806371) on 5/3/2006 at 60 Years.  Comments:  10/24/2013 10:59 Emma Giang  cystoscopy  Repair of cystocele and rectocele (09318978) on 5/3/2006  at 60 Years.  Sacrospinous fixation of vaginal vault (032741420) on 5/3/2006 at 60 Years.  Urodynamics (051584859) on 2006 at 60 Years.  Flexible sigmoidoscopy (23819372) on 2004 at 58 Years.  Colposcopy (7299038381) on 2002 at 56 Years.  LEEP (85066610) on 2000 at 54 Years.  Colposcopy (9488310886) on 2000 at 54 Years.  Colposcopy (4173431025) on 1997 at 51 Years.  Colposcopy (7941161009) on 3/9/1994 at 48 Years.  Procedure on ankle (138431571) in 1958 at 13 Years.  Tonsillectomy (049432802) in 1947 at 2 Years.  Childbirth (4509494094).  Comments:  10/24/2013 11:23 AM - Emma PayneVD/no date  Childbirth (7071761889).  Comments:  10/24/2013 11:24 AM - Emma Payne/no date  Miscarriage ([id number]).   Social History:        Alcohol Assessment            Never      Tobacco Assessment            Never      Substance Abuse Assessment            Never      Employment and Education Assessment            Retired, Work/School description: .      Home and Environment Assessment            Marital status: .  Spouse/Partner name: Griffin.  Lives with Spouse.      Nutrition and Health Assessment            Type of diet: Regular.      Exercise and Physical Activity Assessment: Does not exercise            Exercise type: Walking.      Sexual Assessment            Sexually active: Yes.  Sexual orientation: Heterosexual.  Other contraceptive use: None.        Physical Examination   Vital Signs   3/17/2017 10:27 AM CDT Peripheral Pulse Rate 82 bpm    Pulse Site Radial artery    Systolic Blood Pressure 130 mmHg    Diastolic Blood Pressure 84 mmHg    Mean Arterial Pressure 99 mmHg    BP Site Right arm    Oxygen Saturation 98 %      Measurements from flowsheet : Measurements   3/17/2017 10:27 AM CDT Height Measured - Standard 70 in    Weight Measured - Standard 184.2 lb    BSA 2.03 m2    Body Mass Index 26.43 kg/m2      General:  Alert and oriented, No acute distress.    Eye:   Pupils are equal, round and reactive to light, Normal conjunctiva.    HENT:  Oral mucosa is moist.    Neck:  Supple.    Respiratory:  Respirations are non-labored.    Cardiovascular:  Normal rate, Regular rhythm, No edema.    Gastrointestinal:  Non-distended.    Musculoskeletal:  Normal gait.    Integumentary:  Warm, No rash.    Neurologic:  Alert, Oriented.    Psychiatric:  Cooperative, Appropriate mood & affect, Normal judgment.       Review / Management   Results review      Impression and Plan       Diagnosis     Memory Loss or Impairment (PNK59-TY R41.3).     Incurvated nail (EKO01-VG L60.8).     Course:  Progressing as expected.    Plan:  Will send in Namenda: Start at 7mg a day then increase to next dose each week, can stay at one dose if you are not ready to increase. Can increase up to 28mg. Can stop at 14 as 21 may cause an increase in nausea.     Try to use a toenail scraper to go under the nail and lift it up, need to push the skin back and lift the nail up. Can return if you would like it removed.      .    Nandini DOUGHERTY Medical Assistant acted solely as a scribe for, and in presence of Dr. Cale Horta who performed the services.

## 2022-02-16 NOTE — NURSING NOTE
Phone Message    PCP:    KEREN      Time of Call:  12:57       Person Calling:    Phone number:  [phone]    Note:  Patient was referred to Dr. De Luna's office in [place].  The office called them to schedule but the number eft for all back does not work.  I provided him with the correct number to call.

## 2022-02-16 NOTE — PROGRESS NOTES
Patient:   MARY ALICE SMITH            MRN: [id number]            FIN: [id number]               Age:   71 years     Sex:  Female     :  1945   Associated Diagnoses:   Hypercholesteremia; Hypertension; Memory Loss or Impairment; ADHD (attention deficit hyperactivity disorder)   Author:   Cale Horta MD      Chief Complaint   2017 8:49 AM CST    Needs refill of Adderall      Interval History   Follow up on attention deficit disorder.    Medication Dose:  Adderall 10 mg q am    Daily Functions (School/Grades/Work):  She is doing well on medication and notices benefit while on it.    Memory loss: She currently takes Aricept.She states that she currently thinking of doing a intranasal insulin trial for mild alzheimer. She understands that the there may or may not be a benefit for her but it may be benefical for other patients. She rely's on her  Griffin a lot to remember things, she doesn't trust herself. She still continues to drive but stays local.      Review of Systems   Constitutional:  Negative.    Eye:  Negative.    Ear/Nose/Mouth/Throat:  Negative.    Respiratory:  Negative.    Cardiovascular:  No palpitations, No tachycardia.    Gastrointestinal:  No nausea, No vomiting, No diarrhea.    Genitourinary:  Negative.    Musculoskeletal:  Negative.    Integumentary:  Negative.    Neurologic:  No headache.    Psychiatric:  No anxiety, No depression.       Health Status   Allergies:    Allergic Reactions (Selected)  Severity Not Documented  Sulfa drugs (No reactions were documented)   Problem list:    All Problems  Depression / SNOMED CT 769898950 / Confirmed  Mild obstructive sleep apnea / SNOMED CT 897007438 / Confirmed  Mitral valve prolapse / ICD-9-.0 / Confirmed  Memory Loss or Impairment / ICD-9-.93 / Confirmed  Hypertension / ICD-9-.9 / Confirmed  Hypercholesteremia / SNOMED CT 33668205 / Confirmed  ADHD (attention deficit hyperactivity disorder) / SNOMED CT 61072032 /  Confirmed  Resolved: Pregnancy / SNOMED CT 652836331  Resolved: Pregnancy / SNOMED CT 000083647  Canceled: Hypertension / ICD-9-.9  Canceled: Hypercholesterolemia / SNOMED CT 15000085   Medications:  (Selected)   Prescriptions  Prescribed  Adderall XR 10 mg oral capsule, extended release: 1 cap(s) ( 10 mg ), po, qam, # 30 cap(s), 0 Refill(s), Type: Maintenance, Pharmacy: [facility] [address], 1 cap(s) po qam  Adderall XR 10 mg oral capsule, extended release: 1 cap(s) ( 10 mg ), po, qam, Instructions: fill in 1 month, # 30 cap(s), 0 Refill(s), Type: Maintenance, Pharmacy: [facility] [address], 1 cap(s) po qam,Instr:fill in 1 month  Aricept 10 mg oral tablet: 1 tab(s) ( 10 mg ), PO, Once a day (at bedtime), # 90 tab(s), 3 Refill(s), Type: Maintenance, Pharmacy: [facility] [address], 1 tab(s) po hs  Detrol LA 4 mg oral capsule, extended release: 1 cap(s) ( 4 mg ), po, daily, # 90 cap(s), 3 Refill(s), Type: Maintenance, Pharmacy: [facility] [address], 1 cap(s) po daily  Imitrex 50 mg oral tablet: 1 tab(s) ( 50 mg ), PO, Once, Instructions: may repeat dose once in 2 hours, PRN: for migraine headache, # 18 tab(s), 1 Refill(s), Type: Soft Stop, Pharmacy: [facility] [address], 1 tab(s) po once,PRN:for migraine headache,Instr:may repeat dose...  Zithromax Z-Rafa 250 mg oral tablet: Take 2 tablets on Day 1 and then 1 tablet, po, daily, # 6 tab(s), 0 Refill(s), Type: Soft Stop, Pharmacy: [facility] [address], Take 2 tablets on Day 1 and then 1 tablet po daily  buPROPion 150 mg/12 hours (SR) oral tablet, extended release: 1 tab(s) ( 150 mg ), po, bid, # 180 tab(s), 0 Refill(s), Type: Maintenance, Pharmacy: [facility] [address]  hydrochlorothiazide-lisinopril 12.5 mg-20 mg oral tablet: 1 tab(s), po, daily, # 90 tab(s), 3 Refill(s), Type: Maintenance, Pharmacy: [facility] [address], 1 tab(s) po daily  simvastatin 20 mg oral tablet: 1 tab(s) ( 20 mg ), PO, Once a day (at  bedtime), # 90 tab(s), 3 Refill(s), Type: Maintenance, Pharmacy: [facility] [address], 1 tab(s) po hs  triamcinolone 0.1% topical cream: 1 sami, TOP, BID, # 15 g, 1 Refill(s), Type: Maintenance, Pharmacy: [facility] [address], 1 sami top bid  Documented Medications  Documented  Adderall XR 10 mg oral capsule, extended release: 1 cap(s) ( 10 mg ), po, qam, 0 Refill(s), Type: Maintenance  Aleve: ( 220 mg ), po, 0 Refill(s), Type: Maintenance  Calcium 600+D: ( 1,200 mg ), po, daily, 0 Refill(s), Type: Maintenance  Lucy-C 500 mg oral tablet: ( 500 mg ), po, daily, 0 Refill(s), Type: Maintenance  Ginko: Ginko, Supply, 0 Refill(s), Type: Maintenance  Multiple Vitamins oral tablet: 1 tab(s), po, daily, tab(s), 0 Refill(s), Type: Maintenance  Vitamin D with Minerals oral tablet: 1 tab(s), po, daily, 0 Refill(s), Type: Maintenance  biotin: PO, daily, 0 Refill(s), Type: Soft Stop  escitalopram 5 mg oral tablet: 1 tab(s) ( 5 mg ), PO, Daily, # 30 tab(s), 0 Refill(s), Type: Maintenance  magnesium oxide: ( 400 mg ), po, 0 Refill(s), Type: Maintenance      Histories   Past Medical History:    Active  Mitral valve prolapse (424.0)  Comments:  2010 CDT 11:53 AM CDT - Emma Payne  prophylaxis  Depression (188162466)  Resolved  Pregnancy (415165143):  Resolved in  at 18 years.  Pregnancy (744913623):  Resolved in  at 23 years.   Family History:    Lymphoma  Father  Dementia  Mother ()  Hypertension  Father  Kidney disease  Father  CVA - Cerebrovascular accident  Grandmother (P)  Grandfather (P)  Aphasia  Mother ()     Procedure history:    CPAP  Titration Study on 10/26/2010 at 64 Years.  Comments:  2011 8:16 AM - Marta Acevedo CMA  Mild MAY.  +7cm H2o  Excision of exposed vaginal mesh on 10/15/2010 at 64 Years.  Comments:  2010 10:59 AM - Marilyn Oleary  Exposed vaginal mesh.  Polysomnogram ([id number]) on 10/6/2010 at 64 Years.  Comments:  2011 8:16 AM - Curtis COHN,  Marta  Mild MAY.  Colonoscopy (147674971) on 2009 at 63 Years.  Comments:  2016 3:21 PM - Emma Terrell MA  Sedation:   IV.    Findings:   no polyps/abnormalities seen  LAVH - Laparoscopy assisted vaginal hysterectomy (7477677782) on 5/3/2006 at 60 Years.  BSO - Bilateral salpingo-oophorectomy (9556934918) on 5/3/2006 at 60 Years.  Introduction of tension free vaginal tape (748749758) on 5/3/2006 at 60 Years.  Comments:  10/24/2013 10:59 AM - Emma Payne  cystoscopy  Repair of cystocele and rectocele (20425897) on 5/3/2006 at 60 Years.  Sacrospinous fixation of vaginal vault (981288556) on 5/3/2006 at 60 Years.  Urodynamics (720221848) on 2006 at 60 Years.  Flexible sigmoidoscopy (12676670) on 2004 at 58 Years.  Colposcopy (0897057112) on 2002 at 56 Years.  LEEP (89007875) on 2000 at 54 Years.  Colposcopy (8283804970) on 2000 at 54 Years.  Colposcopy (4203103818) on 1997 at 51 Years.  Colposcopy (5872057778) on 3/9/1994 at 48 Years.  Procedure on ankle (123457397) in 1958 at 13 Years.  Tonsillectomy (273469936) in 1947 at 2 Years.  Childbirth (4873977025).  Comments:  10/24/2013 11:23 AM - Emma Payne/no date  Childbirth (2310776171).  Comments:  10/24/2013 11:24 AM - Emma Payne/no date  Miscarriage (15485651).   Social History:        Alcohol Assessment            Never      Tobacco Assessment            Never      Substance Abuse Assessment            Never      Employment and Education Assessment            Retired, Work/School description: .      Home and Environment Assessment            Marital status: .  Spouse/Partner name: Griffin.  Lives with Spouse.      Nutrition and Health Assessment            Type of diet: Regular.      Exercise and Physical Activity Assessment: Does not exercise            Exercise type: Walking.      Sexual Assessment            Sexually active: Yes.  Sexual orientation: Heterosexual.  Other contraceptive  use: None.        Physical Examination   Vital Signs   2/28/2017 8:49 AM CST Peripheral Pulse Rate 78 bpm    Systolic Blood Pressure 122 mmHg    Diastolic Blood Pressure 70 mmHg    Mean Arterial Pressure 87 mmHg      Measurements from flowsheet : Measurements   2/28/2017 8:49 AM CST Height Measured - Standard 70 in    Weight Measured - Standard 182.2 lb    BSA 2.02 m2    Body Mass Index 26.14 kg/m2      General:  Alert and oriented, No acute distress.    Eye:  Pupils are equal, round and reactive to light, Normal conjunctiva.    HENT:  Oral mucosa is moist.    Neck:  Supple.    Respiratory:  Respirations are non-labored.    Cardiovascular:  Normal rate, Regular rhythm, No edema.    Gastrointestinal:  Non-distended.    Musculoskeletal:  Normal gait.    Integumentary:  Warm, No rash.    Psychiatric:  Cooperative, Appropriate mood & affect, Normal judgment.       Impression and Plan   Diagnosis     Hypercholesteremia (GQX76-BV E78.0).     Hypertension (MFM66-VK I10).     Memory Loss or Impairment (TED64-RF R41.3).     ADHD (attention deficit hyperactivity disorder) (ZQU35-BC F90.0).     Course:  Progressing as expected.    Plan:  Will refill medication at current dose.  Two prescriptions where sent to pharmacy, one for this month and one for next month. She can call for her third prescription. Will have her follow up in 3 months. Will send to lab for Lipid and chem 8. She will continue thinking about going forward with trial. Discussed we could change her to Numenda but will look into it first. Reviewed expected course, what to watch for, and when to return.   I, Shyann Cortez [facility], acted solely as a scribe for, and in the presence of Dr. Cale Horta who performed the service.    Stimulants can provide significant help; however, there can be side effects including decreased appetite, trouble sleeping, and sometimes irritability or emotions that change quickly.  You must keep careful count of when you take your  medication and keep control of your medication to avoid theft or loss. You will need regular follow up appointments for this medication.  This medicine needs to be prescribed and given to you directly and cannot be called to the pharmacy.  Medications are only part of the therapy; you need to work on your skills and become well educated about attention deficit.  Stimulant medications can be addictive so please contact us before changing your dosing and therapy. .    Orders

## 2022-02-16 NOTE — PROGRESS NOTES
Patient:   SULMA SMITH            MRN: [id number]            FIN: [id number]               Age:   72 years     Sex:  Female     :  1945   Associated Diagnoses:   Memory loss or impairment; ADHD (attention deficit hyperactivity disorder); Hypertension   Author:   Cale Horta MD      Chief Complaint   2018 12:15 PM CDT   pt here for fu with labs, would like refill of adderall      History of Present Illness   see chief complaint as noted above and confirmed with the patient     72 year old female here today with her  to discuss her recent labs. Sulma is a very pleasent women, she has memory impairment and her short term memory is fading. She stay's at home with her , he helps to get her medications, she makes her own breakfast and her  help's with meals she goes where he goes. She is on medications for dementia to help to maintain the her memory. She is on Donepezil 10mg one tab at night and Namenda 5mg one tab twice daily.  notices her memory is worsening yet seems to be slowly getting worse. She notices she will put items places and forget where they go, she notices some shakiness in her hands, and notices her hands will just drop things.     Migraine: She has had a migraine for a few  day's, she has been taking Aleve for this, she feels a tight feeling around her head. She does have Sumatriptan to take yet has not done this for her present migraine.     Adderall: She is on adderall to help with daytime sleepiness, she has been sleeping more often, she will wake up and eat breakfast around 9am and by 11am she is ready for a nap. She sleeps very well through the night even if she does take daytime naps.       Review of Systems   Constitutional:  Decreased activity, No fever.    Respiratory:  No shortness of breath.    Gastrointestinal:  No nausea, No vomiting, No diarrhea, No abdominal pain.    Integumentary:  No rash.    Neurologic:  Headache.       Health Status    Allergies:    Allergic Reactions (Selected)  Severity Not Documented  Sulfa drugs (No reactions were documented)   Medications:  (Selected)   Prescriptions  Prescribed  Adderall XR 10 mg oral capsule, extended release: 1 cap(s) ( 10 mg ), po, qam, # 30 cap(s), 0 Refill(s), Type: Maintenance, Pharmacy: [email or web address] [id number], 1 cap(s) po qam  Adderall XR 10 mg oral capsule, extended release: 1 cap(s) ( 10 mg ), po, qam, # 30 cap(s), 0 Refill(s), Type: Maintenance, Pharmacy: [email or web address] [address], 1 cap(s) po qam  Imitrex 50 mg oral tablet: 1 tab(s) ( 50 mg ), PO, Once, Instructions: may repeat dose once in 2 hours, PRN: for migraine headache, # 18 tab(s), 1 Refill(s), Type: Soft Stop, Pharmacy: [email or web address] [id number], 1 tab(s) po once,PRN:for migraine headache,Instr:may repeat dose...  Namenda 5 mg oral tablet: 1 tab(s) ( 5 mg ), po, bid, # 180 tab(s), 3 Refill(s), Type: Maintenance, Pharmacy: [email or web address] [address], 1 tab(s) po bid,x90 day(s)  buPROPion 150 mg/12 hours (SR) oral tablet, extended release: 1 tab(s) ( 150 mg ), po, bid, # 180 tab(s), 3 Refill(s), Type: Soft Stop, Pharmacy: [email or web address] [address], 1 tab(s) po bid,x90 day(s)  donepezil 10 mg oral tablet: 1 tab(s) ( 10 mg ), po, hs, # 90 tab(s), 3 Refill(s), Type: Maintenance, Pharmacy: [email or web address] [address], 1 tab(s) po hs,x90 day(s)  escitalopram 5 mg oral tablet: 1 tab(s) ( 5 mg ), po, daily, # 90 tab(s), 2 Refill(s), Type: Maintenance, Pharmacy: [email or web address] [address]  hydrochlorothiazide-lisinopril 12.5 mg-20 mg oral tablet: 1 tab(s), po, daily, # 90 tab(s), 0 Refill(s), Type: Maintenance, Pharmacy: [facility] [address]  simvastatin 20 mg oral tablet: 1 tab(s) ( 20 mg ), po, hs, # 90 tab(s), 0 Refill(s), Type: Maintenance, Pharmacy: [facility] [address]  tolterodine 4 mg oral capsule, extended release: 1 cap(s) ( 4 mg ), po, daily, # 90 cap(s), 0 Refill(s), Type: Maintenance, Pharmacy: [facility]  [id number]  Documented Medications  Documented  Aleve: ( 220 mg ), po, 0 Refill(s), Type: Maintenance  Calcium 600+D: ( 1,200 mg ), po, daily, 0 Refill(s), Type: Maintenance  Lucy-C 500 mg oral tablet: ( 500 mg ), po, daily, 0 Refill(s), Type: Maintenance  Ginko: Ginko, Supply, 0 Refill(s), Type: Maintenance  Vitamin D with Minerals oral tablet: 1 tab(s), po, daily, 0 Refill(s), Type: Maintenance  biotin: PO, daily, 0 Refill(s), Type: Soft Stop  magnesium oxide: ( 400 mg ), po, 0 Refill(s), Type: Maintenance   Problem list:    All Problems  Depression / SNOMED CT 508853759 / Confirmed  Mild obstructive sleep apnea / SNOMED CT 509936878 / Confirmed  Mitral valve prolapse / ICD-9-.0 / Confirmed  Memory loss or impairment / SNOMED CT 6599508583 / Confirmed  Hypertension / ICD-9-.9 / Confirmed  Hypercholesteremia / SNOMED CT 48803140 / Confirmed  ADHD (attention deficit hyperactivity disorder) / SNOMED CT 81133171 / Confirmed  Resolved: Pregnancy / SNOMED CT 804893829  Resolved: Pregnancy / SNOMED CT 222915996  Canceled: Hypertension / ICD-9-.9  Canceled: Hypercholesterolemia / SNOMED CT 04323385      Histories   Past Medical History:    Active  Memory loss or impairment (7017041514): Onset on 2013 at 67 years.  Mitral valve prolapse (424.0)  Comments:  2010 CDT 11:53 AM CDT - Emma Payne  prophylaxis  Depression (845940821)  Resolved  Pregnancy (760809077):  Resolved in  at 18 years.  Pregnancy (790831957):  Resolved in  at 23 years.   Family History:    Lymphoma  Father  Dementia  Mother ()  Hypertension  Father  Kidney disease  Father  CVA - Cerebrovascular accident  Grandmother (P)  Grandfather (P)  Aphasia  Mother ()     Procedure history:    CPAP  Titration Study on 10/26/2010 at 64 Years.  Comments:  2011 8:16 AM - Marta Acevedo CMA  Mild MAY.  +7cm H2o  Excision of exposed vaginal mesh on 10/15/2010 at 64 Years.  Comments:  2010 10:59 AM - Anirudh  Marilyn  Exposed vaginal mesh.  Polysomnogram (854478866) on 10/6/2010 at 64 Years.  Comments:  2011 8:16 AM - Marta Acevedo CMA  Mild MAY.  Colonoscopy (907912074) on 2009 at 63 Years.  Comments:  2016 3:21 PM - Emma Terrell MA  Sedation:   IV.    Findings:   no polyps/abnormalities seen  LAVH - Laparoscopy assisted vaginal hysterectomy (7695645439) on 5/3/2006 at 60 Years.  BSO - Bilateral salpingo-oophorectomy (3051635242) on 5/3/2006 at 60 Years.  Introduction of tension free vaginal tape (963965862) on 5/3/2006 at 60 Years.  Comments:  10/24/2013 10:59 AM - Emma Payne  cystoscopy  Repair of cystocele and rectocele (14538824) on 5/3/2006 at 60 Years.  Sacrospinous fixation of vaginal vault (234192898) on 5/3/2006 at 60 Years.  Urodynamics (217629525) on 2006 at 60 Years.  Flexible sigmoidoscopy (50936979) on 2004 at 58 Years.  Colposcopy (4441539139) on 2002 at 56 Years.  LEEP (98633109) on 2000 at 54 Years.  Colposcopy (9785670585) on 2000 at 54 Years.  Colposcopy (4716610519) on 1997 at 51 Years.  Colposcopy (5296618795) on 3/9/1994 at 48 Years.  Procedure on ankle (458733016) in 1958 at 13 Years.  Tonsillectomy (864207079) in 1947 at 2 Years.  Childbirth (8038759622).  Comments:  10/24/2013 11:23 AM - Emma Payne/no date  Childbirth (3611239194).  Comments:  10/24/2013 11:24 AM - Emma Payne/no date  Miscarriage (52805018).   Social History:        Alcohol Assessment            Never      Tobacco Assessment            Never      Substance Abuse Assessment            Never      Employment and Education Assessment            Retired, Work/School description: .      Home and Environment Assessment            Marital status: .  Spouse/Partner name: Griffin.  Lives with Spouse.      Nutrition and Health Assessment            Type of diet: Regular.      Exercise and Physical Activity Assessment: Does not exercise             Exercise type: Walking.      Sexual Assessment            Sexually active: Yes.  Sexual orientation: Heterosexual.  Other contraceptive use: None.        Physical Examination   Vital Signs   4/12/2018 12:15 PM CDT Peripheral Pulse Rate 91 bpm    Pulse Site Radial artery    Systolic Blood Pressure 130 mmHg    Diastolic Blood Pressure 78 mmHg    Mean Arterial Pressure 95 mmHg    BP Site Right arm    Oxygen Saturation 95 %      Measurements from flowsheet : Measurements   4/12/2018 12:15 PM CDT Height Measured - Standard 70 in    Weight Measured - Standard 185 lb    BSA 2.03 m2    Body Mass Index 26.54 kg/m2  HI      General:  Alert and oriented, No acute distress.    Eye:  Pupils are equal, round and reactive to light, Normal conjunctiva.    HENT:  Oral mucosa is moist.    Neck:  Supple.    Respiratory:  Respirations are non-labored.    Cardiovascular:  Normal rate, Regular rhythm, No edema.    Gastrointestinal:  Non-distended.    Integumentary:  Warm, No rash.    Psychiatric:  Cooperative, Appropriate mood & affect, Normal judgment.       Review / Management   Results review      Impression and Plan       Diagnosis     Memory loss or impairment (WXY71-UP R41.3).     ADHD (attention deficit hyperactivity disorder) (QJH50-GH F90.0).     Hypertension (SPL37-LB I10).     Course:  Progressing as expected.    Plan:  increased dose of Adderall due to daytime sleepiness, will see if this will improve the way she is feeling and see if it will give her a boost of get up and go during the day.     Reviewed current medications,  was concerned that one of them could be making her more sleepy, offered to take her off of Escitalopram to see if this would help at all, did discuss possible mood changes if stopping this, she will continue to take at this time.     Given Toradol 30mg IM in clinic today to help with current migraine. Also refilled Sumatriptan for her. She tolerated the Toradol well, felt slight improvement of  "migraine before leaving clinic.      was concerned with current labs showing a slight increase in creatanine level, advised to him this is okay and should not be a concern at this time. On 2-28-17 creatanine was \"1.11\" and on 4-5-18 it is at \"1.18\". Not a huge difference. .    I, Nandini Samson Medical Assistant acted solely as a scribe for, and in presence of Dr. Cale Horta who performed the services.  "

## 2022-02-16 NOTE — NURSING NOTE
Phone Message    PCP: KEREN    Time of call: 2:02pm message was left    Person calling: Griffin pt's   Contact # : [phone]    MESSAGE: Calling requesting a n rx for Adderall XR 10mg 1 tab po qam #30+0 on 1/2/18 - was given 2 rxs. He states that the pharmacy will not refill.    CSA 2/17/16 - due to renew    Last visit/reason: 1/2/18 - ADHD/ADD    I called [facility]. They have a prescription that can be filled 2/1/18.  2:24pm Called and explained to Griffin they have an rx it just can't be refilled yet. He verbalizes understanding and has no further questions.

## 2022-02-16 NOTE — PROGRESS NOTES
Patient:   MARY ALICE SMITH            MRN: [id number]            FIN: [id number]               Age:   72 years     Sex:  Female     :  1945   Associated Diagnoses:   ADHD (attention deficit hyperactivity disorder)   Author:   Cale Horta MD      Visit Information      Date of Service: 2018 09:44 am  Performing Location: [facility]  Encounter#: [id number]      Primary Care Provider (PCP):  Cale Horta MD    NPI# [id number]      Chief Complaint   2018 9:51 AM CST     pt here for med refills      Interval History   72 year old female here today for a follow up on attention deficit disorder.    Medication Dose: Adderall XR 10mg caps she takes one capsule every morning.    Daily Functions (School/Grades/Work): She stays at home with her .     Appetite / Weight: Stable weight and good appettite.     Sleep: She finds she sleeps more often.     Controlled Substance Agreement on file:  _      Review of Systems   Constitutional:  Negative.    Eye:  Negative.    Ear/Nose/Mouth/Throat:  Negative.    Respiratory:  Negative.    Cardiovascular:  No palpitations, No tachycardia.    Gastrointestinal:  No nausea, No vomiting, No diarrhea.    Genitourinary:  Negative.    Musculoskeletal:  Negative.    Integumentary:  Negative.    Neurologic:  No headache.    Psychiatric:  No anxiety, No depression.       Health Status   Allergies:    Allergic Reactions (Selected)  Severity Not Documented  Sulfa drugs (No reactions were documented)   Problem list:    All Problems  Depression / SNOMED CT 882242943 / Confirmed  Mild obstructive sleep apnea / SNOMED CT 453622088 / Confirmed  Mitral valve prolapse / ICD-9-.0 / Confirmed  Memory loss or impairment / SNOMED CT 4112351533 / Confirmed  Hypertension / ICD-9-.9 / Confirmed  Hypercholesteremia / SNOMED CT 05422962 / Confirmed  ADHD (attention deficit hyperactivity disorder) / SNOMED CT 47377200 / Confirmed  Resolved: Pregnancy /  SNOMED CT 814253309  Resolved: Pregnancy / SNOMED CT 150899616  Canceled: Hypertension / ICD-9-.9  Canceled: Hypercholesterolemia / SNOMED CT 14455970   Medications:  (Selected)   Prescriptions  Prescribed  Adderall XR 10 mg oral capsule, extended release: 1 cap(s) ( 10 mg ), po, qam, # 30 cap(s), 0 Refill(s), Type: Maintenance, Pharmacy: [facility] [id number], 1 cap(s) po qam  Detrol LA 4 mg oral capsule, extended release: 1 cap(s) ( 4 mg ), po, daily, # 90 cap(s), 3 Refill(s), Type: Maintenance, Pharmacy: [facility] [id number], 1 cap(s) po daily,x90 day(s)  Imitrex 50 mg oral tablet: 1 tab(s) ( 50 mg ), PO, Once, Instructions: may repeat dose once in 2 hours, PRN: for migraine headache, # 18 tab(s), 1 Refill(s), Type: Soft Stop, Pharmacy: [facility] [id number], 1 tab(s) po once,PRN:for migraine headache,Instr:may repeat dose...  Namenda 5 mg oral tablet: 1 tab(s) ( 5 mg ), po, bid, # 180 tab(s), 3 Refill(s), Type: Maintenance, Pharmacy: [facility] [id number], 1 tab(s) po bid,x90 day(s)  buPROPion 150 mg/12 hours (SR) oral tablet, extended release: 1 tab(s) ( 150 mg ), po, bid, # 180 tab(s), 3 Refill(s), Type: Soft Stop, Pharmacy: [facility] [address], 1 tab(s) po bid,x90 day(s)  donepezil 10 mg oral tablet: 1 tab(s) ( 10 mg ), po, hs, # 90 tab(s), 3 Refill(s), Type: Maintenance, Pharmacy: [facility] [address], 1 tab(s) po hs,x90 day(s)  escitalopram 5 mg oral tablet: 1 tab(s) ( 5 mg ), po, daily, # 90 tab(s), 2 Refill(s), Type: Maintenance, Pharmacy: [facility] [address]  hydrochlorothiazide-lisinopril 12.5 mg-20 mg oral tablet: 1 tab(s), po, daily, # 90 tab(s), 3 Refill(s), Type: Maintenance, Pharmacy: [facility] [address], 1 tab(s) po daily,x90 day(s)  simvastatin 20 mg oral tablet: 1 tab(s) ( 20 mg ), po, hs, # 90 tab(s), 3 Refill(s), Type: Maintenance, Pharmacy: [facility] [address], 1 tab(s) po hs,x90 day(s)  Documented Medications  Documented  Aleve: (  220 mg ), po, 0 Refill(s), Type: Maintenance  Calcium 600+D: ( 1,200 mg ), po, daily, 0 Refill(s), Type: Maintenance  Lucy-C 500 mg oral tablet: ( 500 mg ), po, daily, 0 Refill(s), Type: Maintenance  Ginko: Ginko, Supply, 0 Refill(s), Type: Maintenance  Vitamin D with Minerals oral tablet: 1 tab(s), po, daily, 0 Refill(s), Type: Maintenance  biotin: PO, daily, 0 Refill(s), Type: Soft Stop  magnesium oxide: ( 400 mg ), po, 0 Refill(s), Type: Maintenance      Histories   Past Medical History:    Active  Memory loss or impairment (1686307184): Onset on 2013 at 67 years.  Mitral valve prolapse (424.0)  Comments:  2010 CDT 11:53 AM CDT - Emma Payne  prophylaxis  Depression (234039881)  Resolved  Pregnancy (891877180):  Resolved in  at 18 years.  Pregnancy (743224059):  Resolved in  at 23 years.   Family History:    Lymphoma  Father  Dementia  Mother ()  Hypertension  Father  Kidney disease  Father  CVA - Cerebrovascular accident  Grandmother (P)  Grandfather (P)  Aphasia  Mother ()     Procedure history:    CPAP  Titration Study on 10/26/2010 at 64 Years.  Comments:  2011 8:16 AM - Marta Acevedo CMA  Mild MAY.  +7cm H2o  Excision of exposed vaginal mesh on 10/15/2010 at 64 Years.  Comments:  2010 10:59 AM - Marilyn Oleary  Exposed vaginal mesh.  Polysomnogram (778990497) on 10/6/2010 at 64 Years.  Comments:  2011 8:16 AM - Marta Acevedo CMA  Mild MAY.  Colonoscopy (919159853) on 2009 at 63 Years.  Comments:  2016 3:21 PM - Emma Terrell MA  Sedation:   IV.    Findings:   no polyps/abnormalities seen  LAVH - Laparoscopy assisted vaginal hysterectomy (8123968238) on 5/3/2006 at 60 Years.  BSO - Bilateral salpingo-oophorectomy (0703239641) on 5/3/2006 at 60 Years.  Introduction of tension free vaginal tape (838118935) on 5/3/2006 at 60 Years.  Comments:  10/24/2013 10:59 ANNELISE - Emma Payne  cystoscopy  Repair of cystocele and rectocele (03986834) on  5/3/2006 at 60 Years.  Sacrospinous fixation of vaginal vault (582989307) on 5/3/2006 at 60 Years.  Urodynamics (458972891) on 2006 at 60 Years.  Flexible sigmoidoscopy (71766351) on 2004 at 58 Years.  Colposcopy (4800880306) on 2002 at 56 Years.  LEEP (43905282) on 2000 at 54 Years.  Colposcopy (9896011631) on 2000 at 54 Years.  Colposcopy (6719701844) on 1997 at 51 Years.  Colposcopy (5458879453) on 3/9/1994 at 48 Years.  Procedure on ankle (886301521) in 1958 at 13 Years.  Tonsillectomy (444671517) in 1947 at 2 Years.  Childbirth (9018314366).  Comments:  10/24/2013 11:23 AM - Emma PayneVD/no date  Childbirth (9709406549).  Comments:  10/24/2013 11:24 AM - Emma Payne/no date  Miscarriage (15532862).   Social History:        Alcohol Assessment            Never      Tobacco Assessment            Never      Substance Abuse Assessment            Never      Employment and Education Assessment            Retired, Work/School description: .      Home and Environment Assessment            Marital status: .  Spouse/Partner name: Griffin.  Lives with Spouse.      Nutrition and Health Assessment            Type of diet: Regular.      Exercise and Physical Activity Assessment: Does not exercise            Exercise type: Walking.      Sexual Assessment            Sexually active: Yes.  Sexual orientation: Heterosexual.  Other contraceptive use: None.        Physical Examination   Vital Signs   2018 9:51 AM CST Peripheral Pulse Rate 86 bpm    Pulse Site Radial artery    Systolic Blood Pressure 134 mmHg  HI    Diastolic Blood Pressure 84 mmHg  HI    Mean Arterial Pressure 101 mmHg    BP Site Right arm    Oxygen Saturation 94 %      Measurements from flowsheet : Measurements   2018 9:51 AM CST Height Measured - Standard 70 in    Weight Measured - Standard 192.2 lb    BSA 2.07 m2    Body Mass Index 27.57 kg/m2  HI      General:  Alert and oriented, No acute  distress.    Eye:  Pupils are equal, round and reactive to light, Normal conjunctiva.    HENT:  Oral mucosa is moist.    Neck:  Supple.    Respiratory:  Respirations are non-labored.    Cardiovascular:  Normal rate, Regular rhythm, No edema.    Gastrointestinal:  Non-distended.    Musculoskeletal:  Normal gait.    Integumentary:  Warm, No rash.    Psychiatric:  Cooperative, Appropriate mood & affect, Normal judgment.       Impression and Plan   Diagnosis     ADHD (attention deficit hyperactivity disorder) (UIX69-YE F90.0).     Course:  Progressing as expected, she and  working with her slowly progressive dementia  .    Plan:  Will refill medication at current dose.  Patient given paper prescriptions in clinic today, one to fill now and the second to fill in 1 month.  Patient instructed to call for third prescription in 2 months.  Follow up in 3 months.    Stimulants can provide significant help; however, there can be side effects including decreased appetite, trouble sleeping, and sometimes irritability or emotions that change quickly.  You must keep careful count of when you take your medication and keep control of your medication to avoid theft or loss. You will need regular follow up appointments for this medication.  This medicine needs to be prescribed and given to you directly and cannot be called to the pharmacy.  Medications are only part of the therapy; you need to work on your skills and become well educated about attention deficit.  Stimulant medications can be addictive so please contact us before changing your dosing and therapy. .    Jessie   I, Nandini Samson Medical Assistant acted solely as a scribe for, and in presence of Dr. Cale Horta who performed the services.

## 2022-02-16 NOTE — TELEPHONE ENCOUNTER
Entered by Alyse Flanagan CMA on July 03, 2019 12:48:46 PM CDT  ---------------------  From: Alyse Flanagan CMA   To: [facility]    Sent: 7/3/2019 12:48:46 PM CDT  Subject: Medication Management     ** Not Approved: Patient no longer under Prescriber care **  memantine (MEMANTINE 5MG TABLETS)  TAKE 1 TABLET BY MOUTH TWICE DAILY  Qty:  60 tab(s)        Days Supply:  30        Refills:  0          Substitutions Allowed     Route To Pharmacy - [facility]   Signed by Alyse Flanagan CMA            ------------------------------------------  From: [facility]  To: Cale Horta MD  Sent: July 2, 2019 12:51:16 PM CDT  Subject: Medication Management  Due: July 3, 2019 12:51:16 PM CDT    ** On Hold Pending Signature **  Drug: memantine (memantine 5 mg oral tablet)  1 TAB(S) ORAL BID  Quantity: 60 tab(s)     Days Supply: 0         Refills: 0  Substitutions Allowed  Notes from Pharmacy: Appt due for further refills    Dispensed Drug: memantine (memantine 5 mg oral tablet)  TAKE 1 TABLET BY MOUTH TWICE DAILY  Quantity: 60 tab(s)     Days Supply: 30        Refills: 0  Substitutions Allowed  Notes from Pharmacy:   ------------------------------------------

## 2022-02-16 NOTE — PROGRESS NOTES
Chief Complaint  c/o cough x1 week  History of Present Illness  Coughing a lot for the past week. Had pleurisy in the past. Coughing all day and some at night but able to sleep thru the night. Has minimal production. Cough is increasing.  Review of Systems  Denies fevers and vomiting. Denies SOB. Denies history of blood clots.  Problem List/Past Medical History  Ongoing  ADHD (attention deficit hyperactivity disorder)  Depression  Hypercholesteremia  Hypertension  Memory Loss or Impairment  Mild obstructive sleep apnea  Mitral valve prolapse  Resolved  Pregnancy  Pregnancy  Procedure/Surgical History  CPAP Titration Study (10/26/2010),  Excision of exposed vaginal mesh (10/15/2010),  Polysomnogram (10/06/2010),  Colonoscopy (06/26/2009),  BSO - Bilateral salpingo-oophorectomy (05/03/2006),  Introduction of tension free vaginal tape (05/03/2006),  LAVH - Laparoscopy assisted vaginal hysterectomy (05/03/2006),  Repair of cystocele and rectocele (05/03/2006),  Sacrospinous fixation of vaginal vault (05/03/2006),  Urodynamics (04/25/2006),  Flexible sigmoidoscopy (07/11/2004),  Colposcopy (01/30/2002),  LEEP (07/24/2000),  Colposcopy (05/17/2000),  Colposcopy (02/26/1997),  Colposcopy (03/09/1994),  Procedure on ankle (1958),  Tonsillectomy (1947),  Childbirth,  Childbirth,  Miscarriage.  Home Medications  Adderall XR 10 mg oral capsule, extended release, 10 mg, 1 cap(s), po, qam  Adderall XR 10 mg oral capsule, extended release, 10 mg, 1 cap(s), po, qam  Adderall XR 10 mg oral capsule, extended release, 10 mg, 1 cap(s), po, qam  Aleve, 220 mg, po  Aricept 10 mg oral tablet, 10 mg, 1 tab(s), po, hs, 3 refills  biotin, po, daily  buPROPion 150 mg/12 hours (SR) oral tablet, extended release, 150 mg, 1 tab(s), po, bid  Calcium 600+D, 1200 mg, po, daily  Detrol LA 4 mg oral capsule, extended release, 4 mg, 1 cap(s), po, daily, 3 refills  escitalopram 5 mg oral tablet, 5 mg, 1 tab(s), po, daily  Lucy-C 500 mg oral tablet,  500 mg, po, daily  Ginko  hydrochlorothiazide-lisinopril 12.5 mg-20 mg oral tablet, 1 tab(s), po, daily, 3 refills  Imitrex 50 mg oral tablet, 50 mg, 1 tab(s), po, once, PRN, 1 refills  magnesium oxide, 400 mg, po  Multiple Vitamins oral tablet, 1 tab(s), po, daily  simvastatin 20 mg oral tablet, 20 mg, 1 tab(s), po, hs, 3 refills  triamcinolone 0.1% topical cream, 1 sami, top, bid, 1 refills  Vitamin D with Minerals oral tablet, 1 tab(s), po, daily  Allergies  sulfa drugs  Social History  Tobacco  Never  Immunizations  Physical Exam  Vitals & Measurements  T:?98.6?(Tympanic)?  HR:?78?(Peripheral)?  BP:?122/74?  SpO2:?97%?  HT:?70?in?  WT:?181?lb?  BMI:?25.97?  General: no acute distress  Neck: without Lymphadenopathy  Lungs: mild crackles LLL  Heart: RRR  Normal gait  No edema  Lab Results  Diagnostic Results  Assessment/Plan  LLL pneumonia  Treat with Zithromax and follow up if not improving

## 2022-02-16 NOTE — TELEPHONE ENCOUNTER
---------------------  From: Yovana Cortez CMA ([facility]Rx Pool ([id number]_[facility]))   To: Cale Horta MD;     Sent: 4/3/2019 10:16:14 AM CDT  Subject: FW: Medication Management   Due Date/Time: 4/3/2019 3:09:00 PM CDT     Medication Refill needing approval    PCP:   [facility]    Medication:   Imitrex  Last Filled:  4/12/18    Quantity:  18  Refills:  1  CSA on file?   yes     Date of last office visit and reason:   4/12/18 ADHD/ Memory  Date of last labs pertaining to condition:  na    Note:  Please advise if ok to refill. Pt is very overdue appt. She was due ADHD follow in July 2018 but has not refilled any of her meds including her adhd meds. She has been seeing  a pain specialist. Last note from them was 3/8/19.     Return to Clinic order placed?  Yes. Overdue      ------------------------------------------  From: "[employer] [id number]  To: Cale Horta MD  Sent: April 2, 2019 3:09:54 PM CDT  Subject: Medication Management  Due: April 3, 2019 3:09:54 PM CDT    ** On Hold Pending Signature **  Drug: SUMAtriptan (Imitrex 50 mg oral tablet)  1 TAB(S) PO ONCE,PRN:FOR MIGRAINE HEADACHE,INSTR:MAY REPEAT DOSE ONCE IN 2 HOURS  Quantity: 18 tab(s)     Days Supply: 0         Refills: 0  Substitutions Allowed  Notes from Pharmacy:     Dispensed Drug: SUMAtriptan (SUMAtriptan 50 mg oral tablet)  TAKE 1 TABLET BY MOUTH 1 TIME AS NEEDED FOR MIGRAINE HEADACHE. MAY REPEAT DOSE 1 TIME IN 2 HOURS  Quantity: 18 tab(s)     Days Supply: 30        Refills: 0  Substitutions Allowed  Notes from Pharmacy:   ---------------------------------------------------------------  From: Cale Horta MD   To: [facility] Message Pool ([id number] [facility] [place]);     Sent: 4/3/2019 11:45:00 AM CDT  Subject: RE: Medication Management     i believe she is going to [facility] so no refills---------------------  From: Yovana Cortez CMA   To: [facility] [employer] [id number]    Sent: 4/3/2019 1:01:52 PM CDT  Subject: RE: Medication  Management     ** Not Approved: Patient no longer under Prescriber care **  SUMAtriptan (SUMATRIPTAN 50MG TABLETS)  TAKE 1 TABLET BY MOUTH 1 TIME AS NEEDED FOR MIGRAINE HEADACHE. MAY REPEAT DOSE 1 TIME IN 2 HOURS  Qty:  18 tab(s)        Days Supply:  30        Refills:  0          Substitutions Allowed     Route To Pharmacy - [facility] [id number]   Signed by Yovana Cortez CMA

## 2022-05-08 ENCOUNTER — LAB REQUISITION (OUTPATIENT)
Dept: LAB | Facility: CLINIC | Age: 77
End: 2022-05-08
Payer: COMMERCIAL

## 2022-05-08 DIAGNOSIS — I10 ESSENTIAL (PRIMARY) HYPERTENSION: ICD-10-CM

## 2022-05-08 DIAGNOSIS — D72.829 ELEVATED WHITE BLOOD CELL COUNT, UNSPECIFIED: ICD-10-CM

## 2022-05-10 LAB
ALBUMIN SERPL-MCNC: 3.9 G/DL (ref 3.5–5)
ALP SERPL-CCNC: 94 U/L (ref 45–120)
ALT SERPL W P-5'-P-CCNC: 19 U/L (ref 0–45)
ANION GAP SERPL CALCULATED.3IONS-SCNC: 15 MMOL/L (ref 5–18)
AST SERPL W P-5'-P-CCNC: 17 U/L (ref 0–40)
BILIRUB SERPL-MCNC: 0.6 MG/DL (ref 0–1)
BUN SERPL-MCNC: 18 MG/DL (ref 8–28)
CALCIUM SERPL-MCNC: 9.3 MG/DL (ref 8.5–10.5)
CHLORIDE BLD-SCNC: 106 MMOL/L (ref 98–107)
CO2 SERPL-SCNC: 24 MMOL/L (ref 22–31)
CREAT SERPL-MCNC: 1.04 MG/DL (ref 0.6–1.1)
ERYTHROCYTE [DISTWIDTH] IN BLOOD BY AUTOMATED COUNT: 13.1 % (ref 10–15)
GFR SERPL CREATININE-BSD FRML MDRD: 55 ML/MIN/1.73M2
GLUCOSE BLD-MCNC: 74 MG/DL (ref 70–125)
HCT VFR BLD AUTO: 48.3 % (ref 35–47)
HGB BLD-MCNC: 15.4 G/DL (ref 11.7–15.7)
MCH RBC QN AUTO: 31.2 PG (ref 26.5–33)
MCHC RBC AUTO-ENTMCNC: 31.9 G/DL (ref 31.5–36.5)
MCV RBC AUTO: 98 FL (ref 78–100)
PLATELET # BLD AUTO: 288 10E3/UL (ref 150–450)
POTASSIUM BLD-SCNC: 4.2 MMOL/L (ref 3.5–5)
PROT SERPL-MCNC: 6.3 G/DL (ref 6–8)
RBC # BLD AUTO: 4.93 10E6/UL (ref 3.8–5.2)
SODIUM SERPL-SCNC: 145 MMOL/L (ref 136–145)
WBC # BLD AUTO: 7.5 10E3/UL (ref 4–11)

## 2022-05-10 PROCEDURE — 36415 COLL VENOUS BLD VENIPUNCTURE: CPT | Mod: ORL | Performed by: FAMILY MEDICINE

## 2022-05-10 PROCEDURE — 80053 COMPREHEN METABOLIC PANEL: CPT | Mod: ORL | Performed by: FAMILY MEDICINE

## 2022-05-10 PROCEDURE — 85027 COMPLETE CBC AUTOMATED: CPT | Mod: ORL | Performed by: FAMILY MEDICINE

## 2023-01-13 ENCOUNTER — LAB REQUISITION (OUTPATIENT)
Dept: LAB | Facility: CLINIC | Age: 78
End: 2023-01-13
Payer: COMMERCIAL

## 2023-01-13 DIAGNOSIS — I10 ESSENTIAL (PRIMARY) HYPERTENSION: ICD-10-CM

## 2023-01-17 LAB
ANION GAP SERPL CALCULATED.3IONS-SCNC: 15 MMOL/L (ref 7–15)
BUN SERPL-MCNC: 11.3 MG/DL (ref 8–23)
CALCIUM SERPL-MCNC: 8.2 MG/DL (ref 8.8–10.2)
CHLORIDE SERPL-SCNC: 107 MMOL/L (ref 98–107)
CREAT SERPL-MCNC: 0.68 MG/DL (ref 0.51–0.95)
DEPRECATED HCO3 PLAS-SCNC: 18 MMOL/L (ref 22–29)
ERYTHROCYTE [DISTWIDTH] IN BLOOD BY AUTOMATED COUNT: 15.3 % (ref 10–15)
GFR SERPL CREATININE-BSD FRML MDRD: 88 ML/MIN/1.73M2
GLUCOSE SERPL-MCNC: 72 MG/DL (ref 70–99)
HCT VFR BLD AUTO: 35.9 % (ref 35–47)
HGB BLD-MCNC: 11.1 G/DL (ref 11.7–15.7)
MCH RBC QN AUTO: 35.6 PG (ref 26.5–33)
MCHC RBC AUTO-ENTMCNC: 30.9 G/DL (ref 31.5–36.5)
MCV RBC AUTO: 115 FL (ref 78–100)
PLATELET # BLD AUTO: 437 10E3/UL (ref 150–450)
POTASSIUM SERPL-SCNC: 4.2 MMOL/L (ref 3.4–5.3)
RBC # BLD AUTO: 3.12 10E6/UL (ref 3.8–5.2)
SODIUM SERPL-SCNC: 140 MMOL/L (ref 136–145)
WBC # BLD AUTO: 5.5 10E3/UL (ref 4–11)

## 2023-01-17 PROCEDURE — 80048 BASIC METABOLIC PNL TOTAL CA: CPT | Mod: ORL | Performed by: FAMILY MEDICINE

## 2023-01-17 PROCEDURE — 85027 COMPLETE CBC AUTOMATED: CPT | Mod: ORL | Performed by: FAMILY MEDICINE

## 2023-01-17 PROCEDURE — 36415 COLL VENOUS BLD VENIPUNCTURE: CPT | Mod: ORL | Performed by: FAMILY MEDICINE

## 2023-01-17 PROCEDURE — P9603 ONE-WAY ALLOW PRORATED MILES: HCPCS | Mod: ORL | Performed by: FAMILY MEDICINE

## 2023-02-05 ENCOUNTER — LAB REQUISITION (OUTPATIENT)
Dept: LAB | Facility: CLINIC | Age: 78
End: 2023-02-05
Payer: COMMERCIAL

## 2023-02-05 DIAGNOSIS — D64.9 ANEMIA, UNSPECIFIED: ICD-10-CM

## 2023-02-07 LAB
ERYTHROCYTE [DISTWIDTH] IN BLOOD BY AUTOMATED COUNT: 13.8 % (ref 10–15)
FOLATE SERPL-MCNC: 34.2 NG/ML (ref 4.6–34.8)
HCT VFR BLD AUTO: 44.4 % (ref 35–47)
HGB BLD-MCNC: 14 G/DL (ref 11.7–15.7)
MCH RBC QN AUTO: 30.8 PG (ref 26.5–33)
MCHC RBC AUTO-ENTMCNC: 31.5 G/DL (ref 31.5–36.5)
MCV RBC AUTO: 98 FL (ref 78–100)
PLATELET # BLD AUTO: 259 10E3/UL (ref 150–450)
RBC # BLD AUTO: 4.54 10E6/UL (ref 3.8–5.2)
VIT B12 SERPL-MCNC: 555 PG/ML (ref 232–1245)
WBC # BLD AUTO: 5.8 10E3/UL (ref 4–11)

## 2023-02-07 PROCEDURE — 82607 VITAMIN B-12: CPT | Mod: ORL | Performed by: PHYSICIAN ASSISTANT

## 2023-02-07 PROCEDURE — 85027 COMPLETE CBC AUTOMATED: CPT | Mod: ORL | Performed by: PHYSICIAN ASSISTANT

## 2023-02-07 PROCEDURE — 82746 ASSAY OF FOLIC ACID SERUM: CPT | Mod: ORL | Performed by: PHYSICIAN ASSISTANT

## 2023-02-07 PROCEDURE — P9603 ONE-WAY ALLOW PRORATED MILES: HCPCS | Mod: ORL | Performed by: PHYSICIAN ASSISTANT

## 2023-02-07 PROCEDURE — 36415 COLL VENOUS BLD VENIPUNCTURE: CPT | Mod: ORL | Performed by: PHYSICIAN ASSISTANT

## 2023-03-20 ENCOUNTER — LAB REQUISITION (OUTPATIENT)
Dept: LAB | Facility: CLINIC | Age: 78
End: 2023-03-20
Payer: COMMERCIAL

## 2023-03-20 DIAGNOSIS — R60.0 LOCALIZED EDEMA: ICD-10-CM

## 2023-03-21 LAB
ANION GAP SERPL CALCULATED.3IONS-SCNC: 12 MMOL/L (ref 7–15)
BUN SERPL-MCNC: 20.5 MG/DL (ref 8–23)
CALCIUM SERPL-MCNC: 9.7 MG/DL (ref 8.8–10.2)
CHLORIDE SERPL-SCNC: 105 MMOL/L (ref 98–107)
CREAT SERPL-MCNC: 1.18 MG/DL (ref 0.51–0.95)
DEPRECATED HCO3 PLAS-SCNC: 27 MMOL/L (ref 22–29)
GFR SERPL CREATININE-BSD FRML MDRD: 47 ML/MIN/1.73M2
GLUCOSE SERPL-MCNC: 70 MG/DL (ref 70–99)
POTASSIUM SERPL-SCNC: 4.1 MMOL/L (ref 3.4–5.3)
SODIUM SERPL-SCNC: 144 MMOL/L (ref 136–145)

## 2023-03-21 PROCEDURE — P9603 ONE-WAY ALLOW PRORATED MILES: HCPCS | Mod: ORL | Performed by: PHYSICIAN ASSISTANT

## 2023-03-21 PROCEDURE — 80048 BASIC METABOLIC PNL TOTAL CA: CPT | Mod: ORL | Performed by: PHYSICIAN ASSISTANT

## 2023-03-21 PROCEDURE — 36415 COLL VENOUS BLD VENIPUNCTURE: CPT | Mod: ORL | Performed by: PHYSICIAN ASSISTANT

## 2023-03-31 ENCOUNTER — LAB REQUISITION (OUTPATIENT)
Dept: LAB | Facility: CLINIC | Age: 78
End: 2023-03-31
Payer: COMMERCIAL

## 2023-03-31 DIAGNOSIS — N18.32 CHRONIC KIDNEY DISEASE, STAGE 3B (H): ICD-10-CM

## 2023-03-31 DIAGNOSIS — R60.0 LOCALIZED EDEMA: ICD-10-CM

## 2023-04-04 LAB
ANION GAP SERPL CALCULATED.3IONS-SCNC: 14 MMOL/L (ref 7–15)
BUN SERPL-MCNC: 20 MG/DL (ref 8–23)
CALCIUM SERPL-MCNC: 9.7 MG/DL (ref 8.8–10.2)
CHLORIDE SERPL-SCNC: 106 MMOL/L (ref 98–107)
CREAT SERPL-MCNC: 1.19 MG/DL (ref 0.51–0.95)
DEPRECATED HCO3 PLAS-SCNC: 25 MMOL/L (ref 22–29)
ERYTHROCYTE [DISTWIDTH] IN BLOOD BY AUTOMATED COUNT: 13.6 % (ref 10–15)
GFR SERPL CREATININE-BSD FRML MDRD: 46 ML/MIN/1.73M2
GLUCOSE SERPL-MCNC: 77 MG/DL (ref 70–99)
HCT VFR BLD AUTO: 46.2 % (ref 35–47)
HGB BLD-MCNC: 14.4 G/DL (ref 11.7–15.7)
MCH RBC QN AUTO: 31.3 PG (ref 26.5–33)
MCHC RBC AUTO-ENTMCNC: 31.2 G/DL (ref 31.5–36.5)
MCV RBC AUTO: 100 FL (ref 78–100)
PLATELET # BLD AUTO: 291 10E3/UL (ref 150–450)
POTASSIUM SERPL-SCNC: 4.8 MMOL/L (ref 3.4–5.3)
RBC # BLD AUTO: 4.6 10E6/UL (ref 3.8–5.2)
SODIUM SERPL-SCNC: 145 MMOL/L (ref 136–145)
WBC # BLD AUTO: 9.1 10E3/UL (ref 4–11)

## 2023-04-04 PROCEDURE — 85027 COMPLETE CBC AUTOMATED: CPT | Mod: ORL | Performed by: PHYSICIAN ASSISTANT

## 2023-04-04 PROCEDURE — 36415 COLL VENOUS BLD VENIPUNCTURE: CPT | Mod: ORL | Performed by: PHYSICIAN ASSISTANT

## 2023-04-04 PROCEDURE — P9603 ONE-WAY ALLOW PRORATED MILES: HCPCS | Mod: ORL | Performed by: PHYSICIAN ASSISTANT

## 2023-04-04 PROCEDURE — 80048 BASIC METABOLIC PNL TOTAL CA: CPT | Mod: ORL | Performed by: PHYSICIAN ASSISTANT

## 2023-07-10 ENCOUNTER — LAB REQUISITION (OUTPATIENT)
Dept: LAB | Facility: CLINIC | Age: 78
End: 2023-07-10
Payer: COMMERCIAL

## 2023-07-10 DIAGNOSIS — D64.9 ANEMIA, UNSPECIFIED: ICD-10-CM

## 2023-07-11 LAB
ERYTHROCYTE [DISTWIDTH] IN BLOOD BY AUTOMATED COUNT: 13.4 % (ref 10–15)
HCT VFR BLD AUTO: 38.1 % (ref 35–47)
HGB BLD-MCNC: 11.6 G/DL (ref 11.7–15.7)
MCH RBC QN AUTO: 28.3 PG (ref 26.5–33)
MCHC RBC AUTO-ENTMCNC: 30.4 G/DL (ref 31.5–36.5)
MCV RBC AUTO: 93 FL (ref 78–100)
PLATELET # BLD AUTO: 290 10E3/UL (ref 150–450)
RBC # BLD AUTO: 4.1 10E6/UL (ref 3.8–5.2)
WBC # BLD AUTO: 7.8 10E3/UL (ref 4–11)

## 2023-07-11 PROCEDURE — 85027 COMPLETE CBC AUTOMATED: CPT | Mod: ORL | Performed by: PHYSICIAN ASSISTANT

## 2023-07-11 PROCEDURE — 36415 COLL VENOUS BLD VENIPUNCTURE: CPT | Mod: ORL | Performed by: PHYSICIAN ASSISTANT

## 2023-07-11 PROCEDURE — P9603 ONE-WAY ALLOW PRORATED MILES: HCPCS | Mod: ORL | Performed by: PHYSICIAN ASSISTANT

## 2023-08-13 ENCOUNTER — LAB REQUISITION (OUTPATIENT)
Dept: LAB | Facility: CLINIC | Age: 78
End: 2023-08-13
Payer: COMMERCIAL

## 2023-08-13 DIAGNOSIS — D64.9 ANEMIA, UNSPECIFIED: ICD-10-CM

## 2023-08-15 LAB
ERYTHROCYTE [DISTWIDTH] IN BLOOD BY AUTOMATED COUNT: 14.8 % (ref 10–15)
HCT VFR BLD AUTO: 46.7 % (ref 35–47)
HGB BLD-MCNC: 14.1 G/DL (ref 11.7–15.7)
MCH RBC QN AUTO: 27.4 PG (ref 26.5–33)
MCHC RBC AUTO-ENTMCNC: 30.2 G/DL (ref 31.5–36.5)
MCV RBC AUTO: 91 FL (ref 78–100)
PLATELET # BLD AUTO: 320 10E3/UL (ref 150–450)
RBC # BLD AUTO: 5.14 10E6/UL (ref 3.8–5.2)
WBC # BLD AUTO: 11.8 10E3/UL (ref 4–11)

## 2023-08-15 PROCEDURE — P9603 ONE-WAY ALLOW PRORATED MILES: HCPCS | Mod: ORL | Performed by: PHYSICIAN ASSISTANT

## 2023-08-15 PROCEDURE — 36415 COLL VENOUS BLD VENIPUNCTURE: CPT | Mod: ORL | Performed by: PHYSICIAN ASSISTANT

## 2023-08-15 PROCEDURE — 85027 COMPLETE CBC AUTOMATED: CPT | Mod: ORL | Performed by: PHYSICIAN ASSISTANT

## 2023-10-08 ENCOUNTER — LAB REQUISITION (OUTPATIENT)
Dept: LAB | Facility: CLINIC | Age: 78
End: 2023-10-08
Payer: COMMERCIAL

## 2023-10-08 DIAGNOSIS — D64.9 ANEMIA, UNSPECIFIED: ICD-10-CM

## 2023-10-08 DIAGNOSIS — I10 ESSENTIAL (PRIMARY) HYPERTENSION: ICD-10-CM

## 2023-10-10 LAB
ANION GAP SERPL CALCULATED.3IONS-SCNC: 15 MMOL/L (ref 7–15)
BUN SERPL-MCNC: 16.9 MG/DL (ref 8–23)
CALCIUM SERPL-MCNC: 9.1 MG/DL (ref 8.8–10.2)
CHLORIDE SERPL-SCNC: 106 MMOL/L (ref 98–107)
CREAT SERPL-MCNC: 1.13 MG/DL (ref 0.51–0.95)
DEPRECATED HCO3 PLAS-SCNC: 22 MMOL/L (ref 22–29)
EGFRCR SERPLBLD CKD-EPI 2021: 49 ML/MIN/1.73M2
ERYTHROCYTE [DISTWIDTH] IN BLOOD BY AUTOMATED COUNT: 18 % (ref 10–15)
GLUCOSE SERPL-MCNC: 112 MG/DL (ref 70–99)
HCT VFR BLD AUTO: 45.2 % (ref 35–47)
HGB BLD-MCNC: 13.8 G/DL (ref 11.7–15.7)
MCH RBC QN AUTO: 27.3 PG (ref 26.5–33)
MCHC RBC AUTO-ENTMCNC: 30.5 G/DL (ref 31.5–36.5)
MCV RBC AUTO: 90 FL (ref 78–100)
PLATELET # BLD AUTO: 284 10E3/UL (ref 150–450)
POTASSIUM SERPL-SCNC: 4 MMOL/L (ref 3.4–5.3)
RBC # BLD AUTO: 5.05 10E6/UL (ref 3.8–5.2)
SODIUM SERPL-SCNC: 143 MMOL/L (ref 135–145)
WBC # BLD AUTO: 7.7 10E3/UL (ref 4–11)

## 2023-10-10 PROCEDURE — 85027 COMPLETE CBC AUTOMATED: CPT | Mod: ORL | Performed by: FAMILY MEDICINE

## 2023-10-10 PROCEDURE — 36415 COLL VENOUS BLD VENIPUNCTURE: CPT | Mod: ORL | Performed by: FAMILY MEDICINE

## 2023-10-10 PROCEDURE — 80048 BASIC METABOLIC PNL TOTAL CA: CPT | Mod: ORL | Performed by: FAMILY MEDICINE

## 2023-10-10 PROCEDURE — P9603 ONE-WAY ALLOW PRORATED MILES: HCPCS | Mod: ORL | Performed by: FAMILY MEDICINE

## 2024-03-01 ENCOUNTER — LAB REQUISITION (OUTPATIENT)
Dept: LAB | Facility: CLINIC | Age: 79
End: 2024-03-01
Payer: COMMERCIAL

## 2024-03-01 LAB
ALBUMIN UR-MCNC: NEGATIVE MG/DL
APPEARANCE UR: CLEAR
BILIRUB UR QL STRIP: NEGATIVE
COLOR UR AUTO: ABNORMAL
GLUCOSE UR STRIP-MCNC: NEGATIVE MG/DL
HGB UR QL STRIP: NEGATIVE
HYALINE CASTS: 1 /LPF
KETONES UR STRIP-MCNC: NEGATIVE MG/DL
LEUKOCYTE ESTERASE UR QL STRIP: NEGATIVE
MUCOUS THREADS #/AREA URNS LPF: PRESENT /LPF
NITRATE UR QL: NEGATIVE
PH UR STRIP: 5.5 [PH] (ref 5–7)
RBC URINE: 1 /HPF
SP GR UR STRIP: 1.02 (ref 1–1.03)
SQUAMOUS EPITHELIAL: 3 /HPF
UROBILINOGEN UR STRIP-MCNC: NORMAL MG/DL
WBC URINE: 2 /HPF

## 2024-03-01 PROCEDURE — 81001 URINALYSIS AUTO W/SCOPE: CPT | Mod: ORL | Performed by: PHYSICIAN ASSISTANT

## 2024-03-01 PROCEDURE — 87086 URINE CULTURE/COLONY COUNT: CPT | Mod: ORL | Performed by: PHYSICIAN ASSISTANT

## 2024-03-02 LAB — BACTERIA UR CULT: NORMAL

## 2024-03-18 ENCOUNTER — LAB REQUISITION (OUTPATIENT)
Dept: LAB | Facility: CLINIC | Age: 79
End: 2024-03-18
Payer: COMMERCIAL

## 2024-03-18 DIAGNOSIS — R63.5 ABNORMAL WEIGHT GAIN: ICD-10-CM

## 2024-03-18 DIAGNOSIS — M62.81 MUSCLE WEAKNESS (GENERALIZED): ICD-10-CM

## 2024-03-19 LAB
ALBUMIN SERPL BCG-MCNC: 4 G/DL (ref 3.5–5.2)
ALP SERPL-CCNC: 78 U/L (ref 40–150)
ALT SERPL W P-5'-P-CCNC: 21 U/L (ref 0–50)
ANION GAP SERPL CALCULATED.3IONS-SCNC: 11 MMOL/L (ref 7–15)
AST SERPL W P-5'-P-CCNC: 23 U/L (ref 0–45)
BILIRUB SERPL-MCNC: 0.2 MG/DL
BUN SERPL-MCNC: 27.6 MG/DL (ref 8–23)
CALCIUM SERPL-MCNC: 9.6 MG/DL (ref 8.8–10.2)
CHLORIDE SERPL-SCNC: 103 MMOL/L (ref 98–107)
CREAT SERPL-MCNC: 1.53 MG/DL (ref 0.51–0.95)
DEPRECATED HCO3 PLAS-SCNC: 24 MMOL/L (ref 22–29)
EGFRCR SERPLBLD CKD-EPI 2021: 34 ML/MIN/1.73M2
ERYTHROCYTE [DISTWIDTH] IN BLOOD BY AUTOMATED COUNT: 14 % (ref 10–15)
GLUCOSE SERPL-MCNC: 189 MG/DL (ref 70–99)
HCT VFR BLD AUTO: 32.1 % (ref 35–47)
HGB BLD-MCNC: 10.1 G/DL (ref 11.7–15.7)
MCH RBC QN AUTO: 28.6 PG (ref 26.5–33)
MCHC RBC AUTO-ENTMCNC: 31.5 G/DL (ref 31.5–36.5)
MCV RBC AUTO: 91 FL (ref 78–100)
PLATELET # BLD AUTO: 187 10E3/UL (ref 150–450)
POTASSIUM SERPL-SCNC: 5.7 MMOL/L (ref 3.4–5.3)
PROT SERPL-MCNC: 6.3 G/DL (ref 6.4–8.3)
RBC # BLD AUTO: 3.53 10E6/UL (ref 3.8–5.2)
SODIUM SERPL-SCNC: 138 MMOL/L (ref 135–145)
TSH SERPL DL<=0.005 MIU/L-ACNC: 3.18 UIU/ML (ref 0.3–4.2)
WBC # BLD AUTO: 6 10E3/UL (ref 4–11)

## 2024-03-19 PROCEDURE — 80053 COMPREHEN METABOLIC PANEL: CPT | Mod: ORL | Performed by: PHYSICIAN ASSISTANT

## 2024-03-19 PROCEDURE — P9604 ONE-WAY ALLOW PRORATED TRIP: HCPCS | Mod: ORL | Performed by: PHYSICIAN ASSISTANT

## 2024-03-19 PROCEDURE — 85027 COMPLETE CBC AUTOMATED: CPT | Mod: ORL | Performed by: PHYSICIAN ASSISTANT

## 2024-03-19 PROCEDURE — 84443 ASSAY THYROID STIM HORMONE: CPT | Mod: ORL | Performed by: PHYSICIAN ASSISTANT

## 2024-03-19 PROCEDURE — 36415 COLL VENOUS BLD VENIPUNCTURE: CPT | Mod: ORL | Performed by: PHYSICIAN ASSISTANT

## 2024-10-08 ENCOUNTER — LAB REQUISITION (OUTPATIENT)
Dept: LAB | Facility: CLINIC | Age: 79
End: 2024-10-08
Payer: COMMERCIAL

## 2024-10-08 LAB
ALBUMIN UR-MCNC: 10 MG/DL
APPEARANCE UR: CLEAR
BILIRUB UR QL STRIP: NEGATIVE
COLOR UR AUTO: YELLOW
GLUCOSE UR STRIP-MCNC: NEGATIVE MG/DL
HGB UR QL STRIP: NEGATIVE
KETONES UR STRIP-MCNC: NEGATIVE MG/DL
LEUKOCYTE ESTERASE UR QL STRIP: ABNORMAL
MUCOUS THREADS #/AREA URNS LPF: PRESENT /LPF
NITRATE UR QL: NEGATIVE
PH UR STRIP: 5.5 [PH] (ref 5–7)
RBC URINE: 3 /HPF
SP GR UR STRIP: 1.02 (ref 1–1.03)
SQUAMOUS EPITHELIAL: 7 /HPF
TRANSITIONAL EPI: 1 /HPF
UROBILINOGEN UR STRIP-MCNC: NORMAL MG/DL
WBC URINE: 27 /HPF

## 2024-10-08 PROCEDURE — 81001 URINALYSIS AUTO W/SCOPE: CPT | Mod: ORL | Performed by: INTERNAL MEDICINE

## 2024-10-08 PROCEDURE — 87186 SC STD MICRODIL/AGAR DIL: CPT | Mod: ORL | Performed by: INTERNAL MEDICINE

## 2024-10-10 LAB
BACTERIA UR CULT: ABNORMAL
BACTERIA UR CULT: ABNORMAL